# Patient Record
Sex: MALE | Race: WHITE | NOT HISPANIC OR LATINO | Employment: OTHER | URBAN - METROPOLITAN AREA
[De-identification: names, ages, dates, MRNs, and addresses within clinical notes are randomized per-mention and may not be internally consistent; named-entity substitution may affect disease eponyms.]

---

## 2020-08-31 ENCOUNTER — HISTORICAL (OUTPATIENT)
Dept: RADIOLOGY | Facility: HOSPITAL | Age: 55
End: 2020-08-31

## 2023-02-05 ENCOUNTER — HOSPITAL ENCOUNTER (EMERGENCY)
Facility: HOSPITAL | Age: 58
Discharge: ANOTHER HOSPITAL | End: 2023-02-05
Attending: EMERGENCY MEDICINE | Admitting: INTERNAL MEDICINE
Payer: COMMERCIAL

## 2023-02-05 VITALS
WEIGHT: 222.25 LBS | RESPIRATION RATE: 27 BRPM | SYSTOLIC BLOOD PRESSURE: 147 MMHG | DIASTOLIC BLOOD PRESSURE: 105 MMHG | HEART RATE: 104 BPM | OXYGEN SATURATION: 93 % | TEMPERATURE: 98 F

## 2023-02-05 DIAGNOSIS — I63.9 ACUTE ISCHEMIC STROKE: Primary | ICD-10-CM

## 2023-02-05 DIAGNOSIS — R29.898 LEFT ARM WEAKNESS: ICD-10-CM

## 2023-02-05 DIAGNOSIS — R29.810 FACIAL WEAKNESS: ICD-10-CM

## 2023-02-05 DIAGNOSIS — I63.9 STROKE: ICD-10-CM

## 2023-02-05 DIAGNOSIS — R29.898 LEFT LEG WEAKNESS: ICD-10-CM

## 2023-02-05 DIAGNOSIS — R53.1 GENERAL WEAKNESS: ICD-10-CM

## 2023-02-05 DIAGNOSIS — R53.1 UNILATERAL WEAKNESS: ICD-10-CM

## 2023-02-05 LAB
ALBUMIN SERPL-MCNC: 4 G/DL (ref 3.5–5)
ALBUMIN/GLOB SERPL: 1.3 RATIO (ref 1.1–2)
ALP SERPL-CCNC: 78 UNIT/L (ref 40–150)
ALT SERPL-CCNC: 32 UNIT/L (ref 0–55)
AMPHET UR QL SCN: NEGATIVE
APPEARANCE UR: CLEAR
AST SERPL-CCNC: 21 UNIT/L (ref 5–34)
BACTERIA #/AREA URNS AUTO: NORMAL /HPF
BARBITURATE SCN PRESENT UR: NEGATIVE
BASOPHILS # BLD AUTO: 0.03 X10(3)/MCL (ref 0–0.2)
BASOPHILS NFR BLD AUTO: 0.5 %
BENZODIAZ UR QL SCN: NEGATIVE
BILIRUB UR QL STRIP.AUTO: NEGATIVE MG/DL
BILIRUBIN DIRECT+TOT PNL SERPL-MCNC: 0.9 MG/DL
BUN SERPL-MCNC: 11.5 MG/DL (ref 8.4–25.7)
CALCIUM SERPL-MCNC: 9.5 MG/DL (ref 8.4–10.2)
CANNABINOIDS UR QL SCN: NEGATIVE
CHLORIDE SERPL-SCNC: 106 MMOL/L (ref 98–107)
CHOLEST SERPL-MCNC: 255 MG/DL
CHOLEST/HDLC SERPL: 6 {RATIO} (ref 0–5)
CO2 SERPL-SCNC: 24 MMOL/L (ref 22–29)
COCAINE UR QL SCN: NEGATIVE
COLOR UR AUTO: YELLOW
CREAT SERPL-MCNC: 0.75 MG/DL (ref 0.73–1.18)
CRP SERPL-MCNC: 2.4 MG/L
EOSINOPHIL # BLD AUTO: 0.01 X10(3)/MCL (ref 0–0.9)
EOSINOPHIL NFR BLD AUTO: 0.2 %
ERYTHROCYTE [DISTWIDTH] IN BLOOD BY AUTOMATED COUNT: 14.5 % (ref 11.5–17)
ERYTHROCYTE [SEDIMENTATION RATE] IN BLOOD: 18 MM/HR (ref 0–15)
EST. AVERAGE GLUCOSE BLD GHB EST-MCNC: 134.1 MG/DL
FENTANYL UR QL SCN: NEGATIVE
GFR SERPLBLD CREATININE-BSD FMLA CKD-EPI: >60 MLS/MIN/1.73/M2
GLOBULIN SER-MCNC: 3 GM/DL (ref 2.4–3.5)
GLUCOSE SERPL-MCNC: 113 MG/DL (ref 74–100)
GLUCOSE UR QL STRIP.AUTO: NEGATIVE MG/DL
HBA1C MFR BLD: 6.3 %
HCT VFR BLD AUTO: 46.8 % (ref 42–52)
HDLC SERPL-MCNC: 46 MG/DL (ref 35–60)
HGB BLD-MCNC: 15.5 GM/DL (ref 14–18)
IMM GRANULOCYTES # BLD AUTO: 0.01 X10(3)/MCL (ref 0–0.04)
IMM GRANULOCYTES NFR BLD AUTO: 0.2 %
INR BLD: 0.89 (ref 0–1.3)
KETONES UR QL STRIP.AUTO: NEGATIVE MG/DL
LDLC SERPL CALC-MCNC: 175 MG/DL (ref 50–140)
LEUKOCYTE ESTERASE UR QL STRIP.AUTO: NEGATIVE UNIT/L
LYMPHOCYTES # BLD AUTO: 2.02 X10(3)/MCL (ref 0.6–4.6)
LYMPHOCYTES NFR BLD AUTO: 33.5 %
MCH RBC QN AUTO: 29.5 PG
MCHC RBC AUTO-ENTMCNC: 33.1 MG/DL (ref 33–36)
MCV RBC AUTO: 89.1 FL (ref 80–94)
MDMA UR QL SCN: NEGATIVE
MONOCYTES # BLD AUTO: 0.55 X10(3)/MCL (ref 0.1–1.3)
MONOCYTES NFR BLD AUTO: 9.1 %
NEUTROPHILS # BLD AUTO: 3.41 X10(3)/MCL (ref 2.1–9.2)
NEUTROPHILS NFR BLD AUTO: 56.5 %
NITRITE UR QL STRIP.AUTO: NEGATIVE
NRBC BLD AUTO-RTO: 0 %
OPIATES UR QL SCN: POSITIVE
PCP UR QL: NEGATIVE
PH UR STRIP.AUTO: 7 [PH]
PH UR: 6 [PH] (ref 3–11)
PLATELET # BLD AUTO: 249 X10(3)/MCL (ref 130–400)
PMV BLD AUTO: 10.7 FL (ref 7.4–10.4)
POC PTINR: 1 (ref 0.9–1.2)
POC PTWBT: 12 SEC (ref 9.7–14.3)
POCT GLUCOSE: 102 MG/DL (ref 70–110)
POTASSIUM SERPL-SCNC: 4.8 MMOL/L (ref 3.5–5.1)
PROT SERPL-MCNC: 7 GM/DL (ref 6.4–8.3)
PROT UR QL STRIP.AUTO: NEGATIVE MG/DL
PROTHROMBIN TIME: 12 SECONDS (ref 12.5–14.5)
RBC # BLD AUTO: 5.25 X10(6)/MCL (ref 4.7–6.1)
RBC #/AREA URNS AUTO: <5 /HPF
RBC UR QL AUTO: NEGATIVE UNIT/L
SAMPLE: NORMAL
SODIUM SERPL-SCNC: 141 MMOL/L (ref 136–145)
SP GR UR STRIP.AUTO: >=1.04 (ref 1–1.03)
SPECIFIC GRAVITY, URINE AUTO (.000) (OHS): 1.04 (ref 1–1.03)
SQUAMOUS #/AREA URNS AUTO: <5 /HPF
TRIGL SERPL-MCNC: 170 MG/DL (ref 34–140)
TROPONIN I SERPL-MCNC: <0.01 NG/ML (ref 0–0.04)
TSH SERPL-ACNC: 0.77 UIU/ML (ref 0.35–4.94)
UROBILINOGEN UR STRIP-ACNC: 0.2 MG/DL
VLDLC SERPL CALC-MCNC: 34 MG/DL
WBC # SPEC AUTO: 6 X10(3)/MCL (ref 4.5–11.5)
WBC #/AREA URNS AUTO: <5 /HPF

## 2023-02-05 PROCEDURE — 99291 CRITICAL CARE FIRST HOUR: CPT | Mod: 25

## 2023-02-05 PROCEDURE — 85610 PROTHROMBIN TIME: CPT | Performed by: NURSE PRACTITIONER

## 2023-02-05 PROCEDURE — 80307 DRUG TEST PRSMV CHEM ANLYZR: CPT | Performed by: INTERNAL MEDICINE

## 2023-02-05 PROCEDURE — 93010 ELECTROCARDIOGRAM REPORT: CPT | Mod: ,,, | Performed by: INTERNAL MEDICINE

## 2023-02-05 PROCEDURE — 11000001 HC ACUTE MED/SURG PRIVATE ROOM

## 2023-02-05 PROCEDURE — 84484 ASSAY OF TROPONIN QUANT: CPT | Performed by: NURSE PRACTITIONER

## 2023-02-05 PROCEDURE — 85025 COMPLETE CBC W/AUTO DIFF WBC: CPT | Performed by: NURSE PRACTITIONER

## 2023-02-05 PROCEDURE — 80061 LIPID PANEL: CPT | Performed by: PHYSICIAN ASSISTANT

## 2023-02-05 PROCEDURE — 84443 ASSAY THYROID STIM HORMONE: CPT | Performed by: PHYSICIAN ASSISTANT

## 2023-02-05 PROCEDURE — 81001 URINALYSIS AUTO W/SCOPE: CPT | Performed by: NURSE PRACTITIONER

## 2023-02-05 PROCEDURE — 25500020 PHARM REV CODE 255: Performed by: EMERGENCY MEDICINE

## 2023-02-05 PROCEDURE — 93005 ELECTROCARDIOGRAM TRACING: CPT

## 2023-02-05 PROCEDURE — 80053 COMPREHEN METABOLIC PANEL: CPT | Performed by: NURSE PRACTITIONER

## 2023-02-05 PROCEDURE — 83036 HEMOGLOBIN GLYCOSYLATED A1C: CPT | Performed by: PHYSICIAN ASSISTANT

## 2023-02-05 PROCEDURE — 25000003 PHARM REV CODE 250: Performed by: EMERGENCY MEDICINE

## 2023-02-05 PROCEDURE — 93010 EKG 12-LEAD: ICD-10-PCS | Mod: ,,, | Performed by: INTERNAL MEDICINE

## 2023-02-05 PROCEDURE — 86140 C-REACTIVE PROTEIN: CPT | Performed by: PHYSICIAN ASSISTANT

## 2023-02-05 PROCEDURE — 85651 RBC SED RATE NONAUTOMATED: CPT | Performed by: PHYSICIAN ASSISTANT

## 2023-02-05 RX ORDER — NAPROXEN SODIUM 220 MG/1
324 TABLET, FILM COATED ORAL
Status: COMPLETED | OUTPATIENT
Start: 2023-02-05 | End: 2023-02-05

## 2023-02-05 RX ORDER — LABETALOL HYDROCHLORIDE 5 MG/ML
10 INJECTION, SOLUTION INTRAVENOUS
Status: DISCONTINUED | OUTPATIENT
Start: 2023-02-05 | End: 2023-02-06 | Stop reason: HOSPADM

## 2023-02-05 RX ORDER — SODIUM CHLORIDE 0.9 % (FLUSH) 0.9 %
10 SYRINGE (ML) INJECTION EVERY 8 HOURS
Status: DISCONTINUED | OUTPATIENT
Start: 2023-02-05 | End: 2023-02-06 | Stop reason: HOSPADM

## 2023-02-05 RX ADMIN — IOPAMIDOL 100 ML: 755 INJECTION, SOLUTION INTRAVENOUS at 02:02

## 2023-02-05 RX ADMIN — Medication 324 MG: at 03:02

## 2023-02-05 NOTE — FIRST PROVIDER EVALUATION
Medical screening examination initiated.  I have conducted a focused provider triage encounter, findings are as follows:    Brief history of present illness:  58y/o M presents the ED with dizziness with left side weakness. Onset 10:30 AM . States all symptoms resolved after one hour.    There were no vitals filed for this visit.    Pertinent physical exam:  AAA x 3    Brief workup plan:  Labs/Imaging     Preliminary workup initiated; this workup will be continued and followed by the physician or advanced practice provider that is assigned to the patient when roomed.

## 2023-02-05 NOTE — Clinical Note
Diagnosis: Unilateral weakness [295553]   Admitting Provider:: INOCENCIA BARRAGAN [[id number]]   Future Attending Provider: INOCENCIA BARRAGAN [[id number]]   Reason for IP Medical Treatment  (Clinical interventions that can only be accomplished in the IP setting? ) :: see diagnosis   Estimated Length of Stay:: 2 midnights   I certify that Inpatient services for greater than or equal to 2 midnights are medically necessary:: Yes   Plans for Post-Acute care--if anticipated (pick the single best option):: A. No post acute care anticipated at this time

## 2023-02-05 NOTE — ED PROVIDER NOTES
Encounter Date: 2/5/2023    SCRIBE #1 NOTE: I, Radha Mckenzie, am scribing for, and in the presence of,  Elton Mitchell MD. I have scribed the following portions of the note - Other sections scribed: HPI, ROS, PE, MDM.     History     Chief Complaint   Patient presents with    Weakness     Pt to ER via POV for weakness.  Started at 1030 am.  Resolved now.  Pt states left side droop and weakness, took approx 30 minutes to resolve.  No symptoms at this time.       Patient is a 57 year old male with a PMH of a stroke and a hx of HTN that presents to the ED for worsening weakness today. Pt reports for about 6 months he has sudden 1-2 minute episodes of weakness in his bilateral legs and arms with positional changes (getting up). His legs shake and he can not control them. Today, he had an episode that affected his entire L-side lasting approximately 30 minutes. Wife reports the left side of his mouth was drooping, his tongue would favor the left side of his mouth, and abnormal speech. He was unable to move left arm or left leg at all today which was new and different.  He reports he feels back to normal at this time. He is prescribed 81 mg of ASA no other anticoagulation.  Wife reports the patient is back to normal currently    The history is provided by the patient and the spouse. No  was used.   Review of patient's allergies indicates:  No Known Allergies  No past medical history on file.  No past surgical history on file.  No family history on file.     Review of Systems   Constitutional:  Negative for chills and fever.   Respiratory:  Negative for cough, chest tightness and shortness of breath.    Cardiovascular:  Negative for chest pain.   Gastrointestinal:  Negative for abdominal pain, nausea and vomiting.   Musculoskeletal:  Negative for myalgias and neck pain.   Neurological:  Positive for weakness. Negative for syncope.        Change in speech during episodes. Shaking.     All other systems  reviewed and are negative.    Physical Exam     Initial Vitals [02/05/23 1324]   BP Pulse Resp Temp SpO2   (!) 141/93 96 18 98.4 °F (36.9 °C) 97 %      MAP       --         Physical Exam    Nursing note and vitals reviewed.  Constitutional: He appears well-developed and well-nourished. No distress.   HENT:   Head: Normocephalic and atraumatic.   Eyes: Conjunctivae and EOM are normal. Pupils are equal, round, and reactive to light.   Cardiovascular:  Normal rate.           Pulmonary/Chest: No respiratory distress. He has no wheezes. He has no rhonchi.   Abdominal: Abdomen is soft. There is no abdominal tenderness. There is no rebound and no guarding.   Musculoskeletal:         General: Normal range of motion.     Neurological: He is alert and oriented to person, place, and time. He has normal strength. No cranial nerve deficit or sensory deficit. GCS score is 15. GCS eye subscore is 4. GCS verbal subscore is 5. GCS motor subscore is 6.   Finger to nose ataxia, more of a tremor, bilaterally. Cranial nerves intact. No pronator drift. No leg drift.  Normal sensation in the upper and lower extremities.  Able to identify objects.  Speech is clear.   Skin: Skin is warm and dry.   Psychiatric: He has a normal mood and affect.       ED Course   Critical Care    Date/Time: 2/5/2023 3:44 PM  Performed by: Elton Mitchell MD  Authorized by: Elton Mitchell MD   Direct patient critical care time: 25 minutes  Additional history critical care time: 5 minutes  Ordering / reviewing critical care time: 10 minutes  Documentation critical care time: 12 minutes  Consulting other physicians critical care time: 8 minutes  Total critical care time (exclusive of procedural time) : 60 minutes      Labs Reviewed   COMPREHENSIVE METABOLIC PANEL - Abnormal; Notable for the following components:       Result Value    Glucose Level 113 (*)     All other components within normal limits   URINALYSIS - Abnormal; Notable for the following  components:    Specific Gravity, UA >=1.040 (*)     All other components within normal limits   CBC WITH DIFFERENTIAL - Abnormal; Notable for the following components:    MPV 10.7 (*)     All other components within normal limits   PROTIME-INR - Abnormal; Notable for the following components:    PT 12.0 (*)     All other components within normal limits   LIPID PANEL - Abnormal; Notable for the following components:    Cholesterol Total 255 (*)     Triglyceride 170 (*)     Cholesterol/HDL Ratio 6 (*)     LDL Cholesterol 175.00 (*)     All other components within normal limits   TROPONIN I - Normal   C-REACTIVE PROTEIN - Normal   URINALYSIS, MICROSCOPIC - Normal   CBC W/ AUTO DIFFERENTIAL    Narrative:     The following orders were created for panel order CBC Auto Differential.  Procedure                               Abnormality         Status                     ---------                               -----------         ------                     CBC with Differential[[id number]]        Abnormal            Final result                 Please view results for these tests on the individual orders.   HEMOGLOBIN A1C   TSH   SEDIMENTATION RATE   DRUG SCREEN, URINE (BEAKER)   URINALYSIS, REFLEX TO URINE CULTURE   POCT GLUCOSE   ISTAT PROCEDURE        ECG Results              EKG 12-lead (Preliminary result)  Result time 02/05/23 13:56:48      ED Interpretation by Elton Mitchell MD (02/05/23 13:56:48, [facility] - Emergency Dept, Emergency Medicine)    EKG at 1:54 p.m..  94 minute.  Normal sinus rhythm no ST elevation or depression.                                  Imaging Results              CT HEAD FOR STROKE (Preliminary result)  Result time 02/05/23 18:04:40      Preliminary result by Pantera Verduzco MD (02/05/23 18:04:40)                   Narrative:    START OF REPORT:  Technique: CT of the head was performed without intravenous contrast with axial as well as coronal and sagittal  images.    Comparison: Correlation is with CTA study dated â2023-02-05 14:26:27â.    Dosage Information: Automated Exposure Control was utilized 998.58 mGy.cm.    Clinical history: New onset left side weakness, slurred speech.    Findings:  Hemorrhage: No acute intracranial hemorrhage is seen.  CSF spaces: The ventricles, sulci and basal cisterns all appear mildly prominent consistent with global cerebral atrophy.  Brain parenchyma: There is preservation of the grey white junction throughout. No acute major vascular territory infarct is identified.  Cerebellum: Unremarkable.  Vascular: There is a dense MCA sign on the right.  Sella and skull base: The sella appears to be within normal limits for age.  Cerebellopontine angles: Within normal limits.  Intracranial calcifications: Incidental note is made of bilateral choroid plexus calcification. Incidental note is made of some pineal region calcification. Incidental note is made of some calcification of the falx.  Calvarium: No acute linear or depressed skull fracture is seen.    Maxillofacial Structures:  Paranasal sinuses: The visualized paranasal sinuses appear clear with no definitive mucoperiosteal thickening or air fluid levels identified.  Orbits: The orbits appear unremarkable.  Zygomatic arches: The zygomatic arches are intact and unremarkable.  Temporal bones and mastoids: The temporal bones and mastoids appear unremarkable.  TMJ: The mandibular condyles appear normally placed with respect to the mandibular fossa.    Visualized upper cervical spine: The visualized cervical spine appears unremarkable.    Notifications: The results were discussed with the emergency room physician (Dr Mitchell) prior to dictation at 2023-02-05 18:11:56 CST.      Impression:  1. There is a dense MCA sign on the right. Correlate with clinical findings as regards further evaluation and follow up.  2. No acute major vascular territory infarct is identified.  3. No acute  intracranial hemorrhage is seen.  4. Details and findings as noted above.                          Preliminary result by Interface, Rad Results In (02/05/23 18:04:40)                   Narrative:    START OF REPORT:  Technique: CT of the head was performed without intravenous contrast with axial as well as coronal and sagittal images.    Comparison: Correlation is with CTA study dated â2023-02-05 14:26:27â.    Dosage Information: Automated Exposure Control was utilized 998.58 mGy.cm.    Clinical history: New onset left side weakness, slurred speech.    Findings:  Hemorrhage: No acute intracranial hemorrhage is seen.  CSF spaces: The ventricles, sulci and basal cisterns all appear mildly prominent consistent with global cerebral atrophy.  Brain parenchyma: There is preservation of the grey white junction throughout. No acute major vascular territory infarct is identified.  Cerebellum: Unremarkable.  Vascular: There is a dense MCA sign on the right.  Sella and skull base: The sella appears to be within normal limits for age.  Cerebellopontine angles: Within normal limits.  Intracranial calcifications: Incidental note is made of bilateral choroid plexus calcification. Incidental note is made of some pineal region calcification. Incidental note is made of some calcification of the falx.  Calvarium: No acute linear or depressed skull fracture is seen.    Maxillofacial Structures:  Paranasal sinuses: The visualized paranasal sinuses appear clear with no definitive mucoperiosteal thickening or air fluid levels identified.  Orbits: The orbits appear unremarkable.  Zygomatic arches: The zygomatic arches are intact and unremarkable.  Temporal bones and mastoids: The temporal bones and mastoids appear unremarkable.  TMJ: The mandibular condyles appear normally placed with respect to the mandibular fossa.    Visualized upper cervical spine: The visualized cervical spine appears unremarkable.    Notifications: The results  were discussed with the emergency room physician (Dr Mitchell) prior to dictation at 2023-02-05 18:11:56 CST.      Impression:  1. There is a dense MCA sign on the right. Correlate with clinical findings as regards further evaluation and follow up.  2. No acute major vascular territory infarct is identified.  3. No acute intracranial hemorrhage is seen.  4. Details and findings as noted above.                                         MRI BRAIN WITHOUT CONTRAST (Final result)  Result time 02/05/23 16:53:44      Final result by Jose Nassar MD (02/05/23 16:53:44)                   Impression:      1.  Right middle cerebral artery territory small acute nonhemorrhagic infarct.    2.  Minimal chronic microangiopathic ischemia.    .      Electronically signed by: Jose Nassar  Date:    02/05/2023  Time:    16:53               Narrative:    EXAMINATION:  MRI BRAIN WITHOUT CONTRAST    CLINICAL HISTORY:  Stroke, follow up;    TECHNIQUE:  Multiplanar MRI sequences were performed of the brain without contrast.    COMPARISON:  CT angiogram February 5, 2023.    FINDINGS:  There is right middle cerebral artery territory acute infarct with diffusion restriction seen on images 14 to 17 series 4 with corresponding low signal intensity on ADC map and T2 FLAIR hyperintensity.  Chronic microvascular ischemic changes are minimal with periventricular and deep white matter few punctate T2 FLAIR hyperintense signals.  Gradient echo sequences demonstrate no evidence of de phasing artifact to suggest hemorrhagic byproducts. The sella and suprasellar areas are unremarkable.    The cerebellar tonsils are normally positioned. There is no acute intracranial hemorrhage, hydrocephalus, midline shift or mass effect. No acute extra axial fluid collections identified. The mastoid air cells are clear.                                       CTA Head and Neck (xpd) (Final result)  Result time 02/05/23 15:22:06      Final result by Jose Nassar MD  (02/05/23 15:22:06)                   Impression:      1.  Severe hemodynamically significant stenosis of the proximal right internal carotid artery by calcified plaques and suggested intraluminal thrombus.    2.  Diminished flow within the M1 segment right middle cerebral artery which tapers distally with no flow within inferior division and diminished flow within superior division of the right middle cerebral artery.    3.  Suspected early infarct in the territory of the right middle cerebral artery.    Findings were notified to Dr. Mitchell February 5, 2023 at 15:21 hours      Electronically signed by: Jose Nassar  Date:    02/05/2023  Time:    15:22               Narrative:    EXAMINATION:  CTA HEAD AND NECK (XPD)    TECHNIQUE:  Brain and neck imaging was performed from skull base to vertex prior to intravenous contrast. CT Angiogram of head and neck was subsequently performed following intravenous contrast administration.  Coronal and sagittal MPR reconstructions were performed in addition to coronal and sagittal MIP reconstructions.    Dose length product was 3529 mGycm. Automated exposure control was utilized to minimize radiation dose.    COMPARISON:  None available.    FINDINGS:  Carotid arteries are assessed in accordance with the NASCET criteria.    The unenhanced portion of the study shows mild chronic microvascular ischemia and atrophy.  There is subtle hypodensity involving the right middle cerebral artery territory suspected for early nonhemorrhagic infarct.  The M1 segment right middle cerebral artery is relatively hyperdense.  There is no intracranial hemorrhage, mass effect or midline shift.  No hydrocephalus    Aortic arch is remarkable for mild calcified plaques without aneurysmal dilatation or dissection.  There is no flow-limiting stenosis involving the brachiocephalic trunk and the subclavian arteries.    There is mild stenosis at the origin of the right common carotid artery.  There is no  hemodynamically significant stenosis of the right common carotid artery and there is no intra luminal thrombus or dissection.  There are calcified plaques which involve the proximal portion of the right internal carotid artery.  There is also associated proximal right internal carotid artery intraluminal hypodensity suggestive for thrombus from image 311 to image 303 series 9.  Combination of these findings result in severe hemodynamically significant stenosis of the proximal right internal carotid artery.  Flow is present within the distal right small caliber internal carotid artery.  There are also calcified plaques which involve the cavernous portion of the right internal carotid artery resulting in 50% stenosis.  There is also narrowing of the proximal portion of the right external carotid artery.    There are calcified plaques which involve the distal common carotid artery prior to bifurcation and extent 2 involve the proximal left internal carotid artery resulting in up to 40% stenosis.  Mild calcified plaques involve the supraclinoid portion left internal carotid artery without significant stenosis.    There is diminished flow within the M1 segment of the right middle cerebral artery which tapers distally and there is no flow within the inferior division of the right middle cerebral artery.  There is diminished flow within superior division of the right middle cerebral artery.  The left middle cerebral artery and bilateral anterior cerebral arteries and major branches are patent.    There is unremarkable flow within bilateral vertebral arteries.  The basilar artery is patent.  There is also flow bilaterally within the posterior cerebral arteries.                                       X-Ray Chest AP Portable (Final result)  Result time 02/05/23 14:30:04      Final result by Carlos Shore MD (02/05/23 14:30:04)                   Impression:      No acute findings.      Electronically signed by: Carlos  Silviano  Date:    02/05/2023  Time:    14:30               Narrative:    EXAMINATION:  XR CHEST AP PORTABLE    CLINICAL HISTORY:  Weakness    COMPARISON:  31 August 2020    FINDINGS:  Portable frontal view of the chest was obtained. The heart is not enlarged.  Lungs are clear.  There is no pneumothorax or significant effusion.                                       Medications   sodium chloride 0.9% flush 10 mL (has no administration in time range)   labetaloL injection 10 mg (has no administration in time range)   iopamidoL (ISOVUE-370) injection 100 mL (100 mLs Intravenous Given 2/5/23 1448)   aspirin chewable tablet 324 mg (324 mg Oral Given 2/5/23 1545)     Medical Decision Making:   History:   I obtained history from: someone other than patient.       <> Summary of History: Patient's wife corresponds with the patient's story.  Old Medical Records: I decided to obtain old medical records.  Independently Interpreted Test(s):   I have ordered and independently interpreted X-rays - see prior notes.  I have ordered and independently interpreted EKG Reading(s) - see prior notes  Clinical Tests:   Lab Tests: Reviewed and Ordered  The following lab test(s) were unremarkable: Troponin, PTT, PT, Urinalysis, CBC, CMP and UPT  Radiological Study: Ordered and Reviewed  Medical Tests: Ordered and Reviewed  Other:   I have discussed this case with another health care provider.   Medical Decision Making  CVA, TIA, medication side effect, seizure    Problems Addressed:  Unilateral weakness: acute illness or injury that poses a threat to life or bodily functions    Amount and/or Complexity of Data Reviewed  Independent Historian: spouse     Details: Patient was unable to move his left arm left leg his left face was drooping and is ruling symptoms lasted about 30 minutes and then resolved  External Data Reviewed: labs, radiology and ECG.  Labs: ordered. Decision-making details documented in ED Course.  Radiology: ordered and  independent interpretation performed. Decision-making details documented in ED Course.  ECG/medicine tests: ordered and independent interpretation performed. Decision-making details documented in ED Course.    Critical Care  Total time providing critical care: 30-74 minutes         Scribe Attestation:   Scribe #1: I performed the above scribed service and the documentation accurately describes the services I performed. I attest to the accuracy of the note.    Attending Attestation:           Physician Attestation for Scribe:  Physician Attestation Statement for Scribe #1: I, Elton Mitchell MD, reviewed documentation, as scribed by Radha Mckenzie in my presence, and it is both accurate and complete.           ED Course as of 02/05/23 1759   Sun Feb 05, 2023   1342 Patient has no neurologic deficits currently with the exception of a mild finger-to-nose ataxia that is bilateral it is more of extension tremor.  This is not a new finding for him.  Takes 81 daily baby aspirin.  Getting CT head if no acute bleed will give 324 of aspirin now.  Symptoms strongly suggestive of stroke which seems to have completely resolved at this time.  I discussed TIA with them.  He will need admission for further stroke workup [LF]   1452 I suspect patient TIA today.  Lab permissive hypertension [LF]   1523 Discussed CT angiogram findings with radiologist.  Patient's symptoms have currently resolved my NIH score is 0.  He is not a candidate for emergent neurointerventional procedure at this time.  Patient will be admitted for further stroke workup and treatment.  Paged Neurology to discuss case  [LF]   1525 Paged Neurology [ED]   1529 Repeat exam neuro exam is unchanged [LF]   1530 Spoke with Neurology [ED]   1531 Discussed case with Dr Mckee [LF]   1532 Paged Hospitalist [ED]   1537 Spoke with Hospitalist   [ED]   1538 Discussed with adm Lindait [LF]   1715 Discussed with Dr Mckee. Recs patient transferred to facility with  Neurointerventional based on new developments [LF]   1719 Paged transfer center.  [ED]   1719 Spoke with transfer center. [ED]   1719 Plan was admit the patient.  He was still boarding in the emergency department had not yet gone up to the floor.  When he finished his MRI he had recurrence of left facial droop left arm and left leg weakness.  Mild slurred speech, mild L gaze deficit. Normal visual fields. Normal sensation, no extinction.  Able to identify objects and people and answer questions appropriately.  NIH 10.  Admission canceled.  Discussed with neurology recommends patient be transferred for Interventional Radiology or neurointerventional we do not have this service today.  Discussed with the transfer center.  [facility] on stroke divert.  They will continue to try to find appropriate placement. Neuro recs NO tenecteplase [LF]   1745 Discussed with transfer center, Dr Galvez will accept ED to ED, their Neuro interventional specialist is aware [LF]      ED Course User Index  [ED] Radha Mckenzie  [LF] Elton Mitchell MD                 Clinical Impression:   Final diagnoses:  [R53.1] Unilateral weakness  [R53.1] General weakness  [I63.9] Acute ischemic stroke (Primary)  [R29.810] Facial weakness  [R29.898] Left arm weakness  [R29.898] Left leg weakness        ED Disposition Condition    Transfer to Another Facility Serious               Elton Mitchell MD  02/05/23 1544    Elton Mitchell MD  02/05/23 1754    Elton Mitchell MD  02/05/23 1756

## 2023-02-06 LAB
ANION GAP SERPL CALC-SCNC: 15 MMOL/L (ref 8–16)
BUN SERPL-MCNC: 11 MG/DL (ref 6–30)
CHLORIDE SERPL-SCNC: 103 MMOL/L (ref 95–110)
CREAT SERPL-MCNC: 0.7 MG/DL (ref 0.5–1.4)
GLUCOSE SERPL-MCNC: 109 MG/DL (ref 70–110)
HCT VFR BLD CALC: 47 %PCV (ref 36–54)
HGB BLD-MCNC: 16 G/DL
POC IONIZED CALCIUM: 1.18 MMOL/L (ref 1.06–1.42)
POC TCO2 (MEASURED): 27 MMOL/L (ref 23–29)
POTASSIUM BLD-SCNC: 4.5 MMOL/L (ref 3.5–5.1)
SAMPLE: NORMAL
SODIUM BLD-SCNC: 140 MMOL/L (ref 136–145)

## 2023-02-07 LAB
LEFT CCA DIST DIAS: 23 CM/S
LEFT CCA DIST SYS: 79 CM/S
LEFT CCA PROX DIAS: 20 CM/S
LEFT CCA PROX SYS: 74 CM/S
LEFT ECA DIAS: 28 CM/S
LEFT ECA SYS: 181 CM/S
LEFT ICA DIST DIAS: 24 CM/S
LEFT ICA DIST SYS: 79 CM/S
LEFT ICA MID DIAS: 21 CM/S
LEFT ICA MID SYS: 88 CM/S
LEFT ICA PROX DIAS: 30 CM/S
LEFT ICA PROX SYS: 123 CM/S
LEFT VERTEBRAL DIAS: 3 CM/S
LEFT VERTEBRAL SYS: 45 CM/S
OHS CV CAROTID RIGHT ICA EDV HIGHEST: 5
OHS CV CAROTID ULTRASOUND LEFT ICA/CCA RATIO: 1.56
OHS CV CAROTID ULTRASOUND RIGHT ICA/CCA RATIO: 1.25
OHS CV PV CAROTID LEFT HIGHEST CCA: 79
OHS CV PV CAROTID LEFT HIGHEST ICA: 123
OHS CV PV CAROTID RIGHT HIGHEST CCA: 32
OHS CV PV CAROTID RIGHT HIGHEST ICA: 20
OHS CV US CAROTID LEFT HIGHEST EDV: 30
RIGHT CCA DIST SYS: 16 CM/S
RIGHT CCA PROX SYS: 32 CM/S
RIGHT ECA SYS: 243 CM/S
RIGHT ICA DIST DIAS: 4 CM/S
RIGHT ICA DIST SYS: 9 CM/S
RIGHT ICA MID DIAS: 5 CM/S
RIGHT ICA MID SYS: 11 CM/S
RIGHT ICA PROX DIAS: 5 CM/S
RIGHT ICA PROX SYS: 20 CM/S
RIGHT VERTEBRAL SYS: 40 CM/S

## 2023-03-20 ENCOUNTER — TELEPHONE (OUTPATIENT)
Dept: FAMILY MEDICINE | Facility: CLINIC | Age: 58
End: 2023-03-20
Payer: COMMERCIAL

## 2023-03-20 NOTE — TELEPHONE ENCOUNTER
Nursing home call requesting refill on Norco after speaking with Dr Bailey he called the facility and spoke with Renetta. The patient has an active prescription for norco being filled at a local pharmacy and he will not be giving him any refills on Norco . He is willing to sign documentation to have his home medication be administered at the facility. This was all explained to the DON Renetta per Dr Bailey.

## 2023-04-04 DIAGNOSIS — I65.29 CAROTID ARTERY STENOSIS: Primary | ICD-10-CM

## 2023-04-19 ENCOUNTER — OFFICE VISIT (OUTPATIENT)
Dept: CARDIAC SURGERY | Facility: CLINIC | Age: 58
End: 2023-04-19
Payer: COMMERCIAL

## 2023-04-19 VITALS
HEART RATE: 79 BPM | HEIGHT: 70 IN | DIASTOLIC BLOOD PRESSURE: 79 MMHG | WEIGHT: 212 LBS | SYSTOLIC BLOOD PRESSURE: 129 MMHG | BODY MASS INDEX: 30.35 KG/M2

## 2023-04-19 DIAGNOSIS — I65.29 OCCLUSION AND STENOSIS OF UNSPECIFIED CAROTID ARTERY: Primary | ICD-10-CM

## 2023-04-19 DIAGNOSIS — I65.29 CAROTID ARTERY STENOSIS: ICD-10-CM

## 2023-04-19 PROCEDURE — 4010F ACE/ARB THERAPY RXD/TAKEN: CPT | Mod: CPTII,,, | Performed by: THORACIC SURGERY (CARDIOTHORACIC VASCULAR SURGERY)

## 2023-04-19 PROCEDURE — 3008F BODY MASS INDEX DOCD: CPT | Mod: CPTII,,, | Performed by: THORACIC SURGERY (CARDIOTHORACIC VASCULAR SURGERY)

## 2023-04-19 PROCEDURE — 1159F PR MEDICATION LIST DOCUMENTED IN MEDICAL RECORD: ICD-10-PCS | Mod: CPTII,,, | Performed by: THORACIC SURGERY (CARDIOTHORACIC VASCULAR SURGERY)

## 2023-04-19 PROCEDURE — 99204 OFFICE O/P NEW MOD 45 MIN: CPT | Mod: ,,, | Performed by: THORACIC SURGERY (CARDIOTHORACIC VASCULAR SURGERY)

## 2023-04-19 PROCEDURE — 3008F PR BODY MASS INDEX (BMI) DOCUMENTED: ICD-10-PCS | Mod: CPTII,,, | Performed by: THORACIC SURGERY (CARDIOTHORACIC VASCULAR SURGERY)

## 2023-04-19 PROCEDURE — 3078F PR MOST RECENT DIASTOLIC BLOOD PRESSURE < 80 MM HG: ICD-10-PCS | Mod: CPTII,,, | Performed by: THORACIC SURGERY (CARDIOTHORACIC VASCULAR SURGERY)

## 2023-04-19 PROCEDURE — 3074F SYST BP LT 130 MM HG: CPT | Mod: CPTII,,, | Performed by: THORACIC SURGERY (CARDIOTHORACIC VASCULAR SURGERY)

## 2023-04-19 PROCEDURE — 1159F MED LIST DOCD IN RCRD: CPT | Mod: CPTII,,, | Performed by: THORACIC SURGERY (CARDIOTHORACIC VASCULAR SURGERY)

## 2023-04-19 PROCEDURE — 3078F DIAST BP <80 MM HG: CPT | Mod: CPTII,,, | Performed by: THORACIC SURGERY (CARDIOTHORACIC VASCULAR SURGERY)

## 2023-04-19 PROCEDURE — 4010F PR ACE/ARB THEARPY RXD/TAKEN: ICD-10-PCS | Mod: CPTII,,, | Performed by: THORACIC SURGERY (CARDIOTHORACIC VASCULAR SURGERY)

## 2023-04-19 PROCEDURE — 3074F PR MOST RECENT SYSTOLIC BLOOD PRESSURE < 130 MM HG: ICD-10-PCS | Mod: CPTII,,, | Performed by: THORACIC SURGERY (CARDIOTHORACIC VASCULAR SURGERY)

## 2023-04-19 PROCEDURE — 99204 PR OFFICE/OUTPT VISIT, NEW, LEVL IV, 45-59 MIN: ICD-10-PCS | Mod: ,,, | Performed by: THORACIC SURGERY (CARDIOTHORACIC VASCULAR SURGERY)

## 2023-04-19 RX ORDER — PANTOPRAZOLE SODIUM 40 MG/1
1 TABLET, DELAYED RELEASE ORAL DAILY
COMMUNITY
Start: 2023-03-04

## 2023-04-19 RX ORDER — EZETIMIBE 10 MG/1
10 TABLET ORAL DAILY
COMMUNITY
Start: 2023-03-25

## 2023-04-19 RX ORDER — FOLIC ACID 1 MG/1
1 TABLET ORAL DAILY
COMMUNITY
Start: 2023-03-04

## 2023-04-19 RX ORDER — LISINOPRIL 10 MG/1
10 TABLET ORAL DAILY
COMMUNITY
Start: 2023-03-25

## 2023-04-19 RX ORDER — OMEPRAZOLE 40 MG/1
40 CAPSULE, DELAYED RELEASE ORAL EVERY MORNING
COMMUNITY
Start: 2023-02-04 | End: 2023-05-01

## 2023-04-19 RX ORDER — AMLODIPINE BESYLATE 5 MG/1
5 TABLET ORAL EVERY MORNING
COMMUNITY
Start: 2023-03-25

## 2023-04-19 RX ORDER — ACETAMINOPHEN, DEXTROMETHORPHAN HBR, DOXYLAMINE SUCCINATE, PHENYLEPHRINE HCL 650; 20; 12.5; 1 MG/30ML; MG/30ML; MG/30ML; MG/30ML
5 SOLUTION ORAL
COMMUNITY
Start: 2023-03-04 | End: 2023-05-04 | Stop reason: CLARIF

## 2023-04-19 RX ORDER — METOPROLOL SUCCINATE 25 MG/1
25 TABLET, EXTENDED RELEASE ORAL DAILY
COMMUNITY
Start: 2023-03-04

## 2023-04-19 RX ORDER — PRAVASTATIN SODIUM 40 MG/1
1 TABLET ORAL NIGHTLY
COMMUNITY
End: 2023-05-01

## 2023-04-19 RX ORDER — MELOXICAM 15 MG/1
15 TABLET ORAL NIGHTLY
COMMUNITY
Start: 2023-03-03

## 2023-04-19 RX ORDER — TALC
2 POWDER (GRAM) TOPICAL
COMMUNITY
Start: 2023-03-03 | End: 2023-05-04 | Stop reason: CLARIF

## 2023-04-19 RX ORDER — ASPIRIN 81 MG/1
81 TABLET ORAL NIGHTLY
COMMUNITY
Start: 2023-03-04

## 2023-04-19 RX ORDER — AMLODIPINE AND BENAZEPRIL HYDROCHLORIDE 10; 20 MG/1; MG/1
1 CAPSULE ORAL
COMMUNITY
Start: 2023-02-04 | End: 2023-05-01

## 2023-04-19 RX ORDER — HYDROCODONE BITARTRATE AND ACETAMINOPHEN 10; 325 MG/1; MG/1
1 TABLET ORAL 2 TIMES DAILY
COMMUNITY
Start: 2023-03-18

## 2023-04-19 RX ORDER — ASCORBIC ACID 500 MG
2 TABLET ORAL DAILY
COMMUNITY
Start: 2023-03-04

## 2023-04-19 RX ORDER — CLOPIDOGREL BISULFATE 75 MG/1
1 TABLET ORAL DAILY
COMMUNITY
Start: 2023-02-11

## 2023-04-19 RX ORDER — AMITRIPTYLINE HYDROCHLORIDE 25 MG/1
25 TABLET, FILM COATED ORAL NIGHTLY
COMMUNITY
Start: 2023-03-20

## 2023-04-19 RX ORDER — CYCLOBENZAPRINE HCL 10 MG
10 TABLET ORAL 3 TIMES DAILY
COMMUNITY
Start: 2023-02-14

## 2023-04-19 RX ORDER — ACETAMINOPHEN 325 MG/1
2 TABLET ORAL
COMMUNITY
Start: 2023-03-03 | End: 2023-05-04 | Stop reason: CLARIF

## 2023-04-19 RX ORDER — OMEGA-3 FATTY ACIDS/FISH OIL 340-1000MG
1 CAPSULE ORAL EVERY MORNING
COMMUNITY

## 2023-04-19 RX ORDER — CHOLECALCIFEROL (VITAMIN D3) 25 MCG
2 TABLET ORAL DAILY
COMMUNITY
Start: 2023-03-04

## 2023-04-19 RX ORDER — LANOLIN ALCOHOL/MO/W.PET/CERES
1 CREAM (GRAM) TOPICAL
COMMUNITY
Start: 2023-02-11 | End: 2023-05-04

## 2023-04-19 RX ORDER — ATORVASTATIN CALCIUM 40 MG/1
80 TABLET, FILM COATED ORAL NIGHTLY
COMMUNITY
Start: 2023-04-18 | End: 2023-11-15

## 2023-04-19 RX ORDER — DULOXETIN HYDROCHLORIDE 60 MG/1
60 CAPSULE, DELAYED RELEASE ORAL NIGHTLY
COMMUNITY
Start: 2023-03-03

## 2023-04-19 NOTE — PROGRESS NOTES
History & Physical    SUBJECTIVE:     History of Present Illness:  The patient is presenting for evaluation of severe right carotid stenosis and moderate left carotid stenosis.  He had a stroke in February at which time a CTA of the neck revealed evidence of severe right carotid stenosis subtotal with possible clot.  They attempted intervention but backed up in [place].  He is here for possible intervention      Chief Complaint   Patient presents with    Pre-op Exam      RE: [facility]       Review of patient's allergies indicates:  No Known Allergies    Current Outpatient Medications   Medication Sig Dispense Refill    acetaminophen (TYLENOL) 325 MG tablet Take 2 tablets by mouth.      ascorbic acid, vitamin C, (VITAMIN C) 500 MG tablet Take 2 tablets by mouth.      aspirin (ECOTRIN) 81 MG EC tablet Take 1 tablet by mouth.      clopidogreL (PLAVIX) 75 mg tablet Take 1 tablet by mouth.      cyanocobalamin, vitamin B-12, 1,000 mcg TbSR Take 5 tablets by mouth.      DULoxetine (CYMBALTA) 60 MG capsule Take by mouth.      folic acid (FOLVITE) 1 MG tablet Take 1 tablet by mouth.      melatonin (MELATIN) 3 mg tablet Take 2 tablets by mouth.      metoprolol succinate (TOPROL XL) 25 MG 24 hr tablet Take 1 tablet by mouth.      pantoprazole (PROTONIX) 40 MG tablet Take 1 tablet by mouth.      thiamine 100 MG tablet Take 1 tablet by mouth.      vitamin D (VITAMIN D3) 1000 units Tab Take 2 tablets by mouth.      amitriptyline (ELAVIL) 25 MG tablet       amLODIPine (NORVASC) 5 MG tablet Take 5 mg by mouth.      amlodipine-benazepril 10-20mg (LOTREL) 10-20 mg per capsule Take 1 capsule by mouth.      atorvastatin (LIPITOR) 40 MG tablet Take 40 mg by mouth.      cyclobenzaprine (FLEXERIL) 10 MG tablet Take 10 mg by mouth 3 (three) times daily.      ezetimibe (ZETIA) 10 mg tablet Take 10 mg by mouth.      HYDROcodone-acetaminophen (NORCO)  mg per tablet Take 1 tablet by mouth 2 (two) times daily.      lisinopriL 10  MG tablet Take 10 mg by mouth.      meloxicam (MOBIC) 15 MG tablet Take 15 mg by mouth.      omega-3 fatty acids-fish oil (FISH OIL) 340-1,000 mg Cap Take 1 capsule by mouth every morning.      omeprazole (PRILOSEC) 40 MG capsule Take 40 mg by mouth every morning.      pravastatin (PRAVACHOL) 40 MG tablet Take 1 tablet by mouth every evening.       No current facility-administered medications for this visit.       History reviewed. No pertinent past medical history.  Past Surgical History:   Procedure Laterality Date    HERNIA REPAIR  18 years    TONSILLECTOMY  55 years    VASECTOMY  23 years     Family History   Problem Relation Age of Onset    Arthritis Mother     Hearing loss Mother         Wears hearing aids    Heart disease Mother         Blockage    Stroke Mother     Arthritis Father     Diabetes Father     Hearing loss Father         Wears hearing aids    Heart disease Father         Heart attacks, blockages    Kidney disease Father     Vision loss Father         Diabetic    Heart disease Maternal Grandfather         Valve replacement, blockage    Early death Maternal Grandmother     Cancer Paternal Grandfather     Stroke Paternal Grandmother     Alcohol abuse Brother     Arthritis Brother     Alcohol abuse Brother     Cancer Brother         Skin cancer    Arthritis Brother     Asthma Maternal Aunt      Social History     Tobacco Use    Smoking status: Never    Smokeless tobacco: Never   Substance Use Topics    Alcohol use: Yes     Alcohol/week: 90.0 standard drinks     Types: 90 Cans of beer per week    Drug use: Yes     Frequency: 14.0 times per week     Types: Hydrocodone     Comment: Pain management        Review of Systems:  Review of Systems   Constitutional: Negative.    HENT: Negative.     Eyes: Negative.    Respiratory: Negative.     Cardiovascular: Negative.    Gastrointestinal: Negative.    Endocrine: Negative.    Genitourinary: Negative.    Musculoskeletal: Negative.         Claudications   Skin:  "Negative.    Allergic/Immunologic: Negative.    Neurological: Negative.    Hematological: Negative.    Psychiatric/Behavioral: Negative.       OBJECTIVE:     Vital Signs (Most Recent)  Pulse: 79 (04/19/23 1134)  BP: 129/79 (04/19/23 1134)  5' 10" (1.778 m)  96.2 kg (212 lb)     Physical Exam:  Physical Exam  Vitals reviewed.   Constitutional:       Appearance: Normal appearance.   HENT:      Head: Normocephalic and atraumatic.      Nose: Nose normal.      Mouth/Throat:      Mouth: Mucous membranes are dry.      Pharynx: Oropharynx is clear.   Eyes:      Extraocular Movements: Extraocular movements intact.      Conjunctiva/sclera: Conjunctivae normal.      Pupils: Pupils are equal, round, and reactive to light.   Cardiovascular:      Rate and Rhythm: Normal rate and regular rhythm.      Pulses: Normal pulses.   Pulmonary:      Effort: Pulmonary effort is normal.      Breath sounds: Normal breath sounds.   Abdominal:      General: Abdomen is flat.      Palpations: Abdomen is soft.   Musculoskeletal:         General: Normal range of motion.      Cervical back: Neck supple.   Skin:     General: Skin is warm and dry.   Neurological:      General: No focal deficit present.      Sensory: Sensory deficit present.      Motor: Weakness present.   Psychiatric:         Mood and Affect: Mood normal.       Laboratory:  None      Diagnostic Results:  CTA from February reviewed.      ASSESSMENT/PLAN:     Severe right carotid stenosis with a moderate left carotid stenosis.  I believe at this point the CTA is indicated as the patient had a clot in the past there.  Plan to get a CTA of the neck and make a decision based on it.  If he indeed has a subtotal obstruction of the right carotid artery I believe right carotid endarterectomy is indicated                "

## 2023-04-25 DIAGNOSIS — I63.9 CEREBROVASCULAR ACCIDENT (CVA), UNSPECIFIED MECHANISM: Primary | ICD-10-CM

## 2023-05-01 ENCOUNTER — OFFICE VISIT (OUTPATIENT)
Dept: NEUROLOGY | Facility: CLINIC | Age: 58
End: 2023-05-01
Payer: COMMERCIAL

## 2023-05-01 VITALS
DIASTOLIC BLOOD PRESSURE: 88 MMHG | HEIGHT: 70 IN | BODY MASS INDEX: 30.35 KG/M2 | SYSTOLIC BLOOD PRESSURE: 126 MMHG | WEIGHT: 212 LBS

## 2023-05-01 DIAGNOSIS — I63.9 CEREBROVASCULAR ACCIDENT (CVA), UNSPECIFIED MECHANISM: ICD-10-CM

## 2023-05-01 DIAGNOSIS — Z01.818 PRE-OP TESTING: ICD-10-CM

## 2023-05-01 DIAGNOSIS — I63.89 OTHER CEREBRAL INFARCTION: ICD-10-CM

## 2023-05-01 DIAGNOSIS — I63.9 CEREBROVASCULAR ACCIDENT (CVA), UNSPECIFIED MECHANISM: Primary | ICD-10-CM

## 2023-05-01 PROCEDURE — 99205 PR OFFICE/OUTPT VISIT, NEW, LEVL V, 60-74 MIN: ICD-10-PCS | Mod: S$GLB,,, | Performed by: PSYCHIATRY & NEUROLOGY

## 2023-05-01 PROCEDURE — 99999 PR PBB SHADOW E&M-EST. PATIENT-LVL IV: CPT | Mod: PBBFAC,,, | Performed by: PSYCHIATRY & NEUROLOGY

## 2023-05-01 PROCEDURE — 1159F PR MEDICATION LIST DOCUMENTED IN MEDICAL RECORD: ICD-10-PCS | Mod: CPTII,S$GLB,, | Performed by: PSYCHIATRY & NEUROLOGY

## 2023-05-01 PROCEDURE — 4010F ACE/ARB THERAPY RXD/TAKEN: CPT | Mod: CPTII,S$GLB,, | Performed by: PSYCHIATRY & NEUROLOGY

## 2023-05-01 PROCEDURE — 1159F MED LIST DOCD IN RCRD: CPT | Mod: CPTII,S$GLB,, | Performed by: PSYCHIATRY & NEUROLOGY

## 2023-05-01 PROCEDURE — 3074F PR MOST RECENT SYSTOLIC BLOOD PRESSURE < 130 MM HG: ICD-10-PCS | Mod: CPTII,S$GLB,, | Performed by: PSYCHIATRY & NEUROLOGY

## 2023-05-01 PROCEDURE — 3079F PR MOST RECENT DIASTOLIC BLOOD PRESSURE 80-89 MM HG: ICD-10-PCS | Mod: CPTII,S$GLB,, | Performed by: PSYCHIATRY & NEUROLOGY

## 2023-05-01 PROCEDURE — 4010F PR ACE/ARB THEARPY RXD/TAKEN: ICD-10-PCS | Mod: CPTII,S$GLB,, | Performed by: PSYCHIATRY & NEUROLOGY

## 2023-05-01 PROCEDURE — 3008F BODY MASS INDEX DOCD: CPT | Mod: CPTII,S$GLB,, | Performed by: PSYCHIATRY & NEUROLOGY

## 2023-05-01 PROCEDURE — 99205 OFFICE O/P NEW HI 60 MIN: CPT | Mod: S$GLB,,, | Performed by: PSYCHIATRY & NEUROLOGY

## 2023-05-01 PROCEDURE — 3008F PR BODY MASS INDEX (BMI) DOCUMENTED: ICD-10-PCS | Mod: CPTII,S$GLB,, | Performed by: PSYCHIATRY & NEUROLOGY

## 2023-05-01 PROCEDURE — 3074F SYST BP LT 130 MM HG: CPT | Mod: CPTII,S$GLB,, | Performed by: PSYCHIATRY & NEUROLOGY

## 2023-05-01 PROCEDURE — 99999 PR PBB SHADOW E&M-EST. PATIENT-LVL IV: ICD-10-PCS | Mod: PBBFAC,,, | Performed by: PSYCHIATRY & NEUROLOGY

## 2023-05-01 PROCEDURE — 3079F DIAST BP 80-89 MM HG: CPT | Mod: CPTII,S$GLB,, | Performed by: PSYCHIATRY & NEUROLOGY

## 2023-05-01 RX ORDER — UBIDECARENONE 30 MG
100 CAPSULE ORAL 3 TIMES DAILY
COMMUNITY
End: 2023-05-04 | Stop reason: CLARIF

## 2023-05-01 NOTE — PROGRESS NOTES
Neurology Outpatient Clinic Visit  Neurology    SUBJECTIVE:   Chief Complaint: Establishment of care due to Hx of CVA's    HPI: Mr. Mcnair is a 57 yom with Hx of previous CVA, HTN, HLD who presents to neurology clinic today for establishment of care.  Patient has CVA on 2/5/23 at which time he presented to [facility] ED, he was then transferred to [facility] in  for neurointerventional services. Was discharged to rehab on 2/10/23, spent 3 weeks at rehab, 3 weeks at NH.  Now home with outpatient therapy.   Was recently evaluated by CV Surgery on 4/19/23 for severe right carotid artery stenosis; previous CTA showed severe stenosis w/ possible clot. Pending further workup to determine if CEA appropriate.     Significant Imaging   MRI Brain 2/5/23:  FINDINGS:  There is right middle cerebral artery territory acute infarct with diffusion restriction seen on images 14 to 17 series 4 with corresponding low signal intensity on ADC map and T2 FLAIR hyperintensity.  Chronic microvascular ischemic changes are minimal with periventricular and deep white matter few punctate T2 FLAIR hyperintense signals.  Gradient echo sequences demonstrate no evidence of de phasing artifact to suggest hemorrhagic byproducts. The sella and suprasellar areas are unremarkable.     The cerebellar tonsils are normally positioned. There is no acute intracranial hemorrhage, hydrocephalus, midline shift or mass effect. No acute extra axial fluid collections identified. The mastoid air cells are clear.     Impression:     1.  Right middle cerebral artery territory small acute nonhemorrhagic infarct.     2.  Minimal chronic microangiopathic ischemia.    CTA Head and Neck 2/5/23:  Impression:     1.  Severe hemodynamically significant stenosis of the proximal right internal carotid artery by calcified plaques and suggested intraluminal thrombus.     2.  Diminished flow within the M1 segment right middle cerebral artery which tapers distally with  "no flow within inferior division and diminished flow within superior division of the right middle cerebral artery.     3.  Suspected early infarct in the territory of the right middle cerebral artery.    Past Medical History:   Diagnosis Date    CVA (cerebral vascular accident)     Depression     Hypertension     Mixed hyperlipidemia        Past Surgical History:   Procedure Laterality Date    HERNIA REPAIR  18 years    TONSILLECTOMY  55 years    VASECTOMY  23 years       Social History     Socioeconomic History    Marital status:    Tobacco Use    Smoking status: Never    Smokeless tobacco: Never   Substance and Sexual Activity    Alcohol use: Not Currently    Drug use: Yes     Frequency: 14.0 times per week     Types: Hydrocodone     Comment: Pain management    Sexual activity: Yes     Partners: Female     Birth control/protection: None     Comment: Hysterectomy           Review of Systems   Constitutional:  Negative for chills and fever.   Respiratory:  Negative for cough and shortness of breath.    Cardiovascular:  Negative for chest pain, palpitations and leg swelling.   Gastrointestinal:  Negative for abdominal pain, diarrhea, heartburn, nausea and vomiting.   Neurological:  Positive for focal weakness and headaches. Negative for sensory change, speech change, seizures and loss of consciousness.           OBJECTIVE:     Vital signs:   /88 (BP Location: Right arm, Patient Position: Sitting)   Ht 5' 10" (1.778 m)   Wt 96.2 kg (212 lb)   BMI 30.42 kg/m²      Physical Examination:  General: Well nourished w/o distress  Pulm: CTA bilaterally, normal work of breathing  CV: S1, S2 w/o murmurs or gallops; no edema noted  GI: Soft, non-tender to palpation,no masses on palpation  MSK: no gross deformities of bilateral UE & LE's  Neuro: AAOx4; LUE motor strength 0/5, RUE 5/5, LLE 0/5, RLE 5/5; CBN II-XII grossly intact, no dysarthria, no facial asymmetry  Psych: Cooperative; appropriate mood and affect, " answers all questions appropriately       Current Outpatient Medications   Medication Instructions    acetaminophen (TYLENOL) 325 MG tablet 2 tablets, Oral    amitriptyline (ELAVIL) 25 MG tablet No dose, route, or frequency recorded.    amLODIPine (NORVASC) 5 mg, Oral    ascorbic acid, vitamin C, (VITAMIN C) 500 MG tablet 2 tablets, Oral    aspirin (ECOTRIN) 81 MG EC tablet 1 tablet, Oral    atorvastatin (LIPITOR) 80 mg, Oral    clopidogreL (PLAVIX) 75 mg tablet 1 tablet, Oral    co-enzyme Q-10 30 mg, Oral, 3 times daily    cyanocobalamin, vitamin B-12, 1,000 mcg TbSR 5 tablets, Oral    cyclobenzaprine (FLEXERIL) 10 mg, Oral, 3 times daily    DULoxetine (CYMBALTA) 60 MG capsule Oral    ezetimibe (ZETIA) 10 mg, Oral    folic acid (FOLVITE) 1 MG tablet 1 tablet, Oral    HYDROcodone-acetaminophen (NORCO)  mg per tablet 1 tablet, Oral, 2 times daily    lisinopriL 10 mg, Oral    melatonin (MELATIN) 3 mg tablet 2 tablets, Oral    meloxicam (MOBIC) 15 mg, Oral    metoprolol succinate (TOPROL-XL) 25 MG 24 hr tablet 1 tablet, Oral    omega-3 fatty acids-fish oil (FISH OIL) 340-1,000 mg Cap 1 capsule, Oral, Every morning    pantoprazole (PROTONIX) 40 MG tablet 1 tablet, Oral    thiamine 100 MG tablet 1 tablet, Oral    vitamin D (VITAMIN D3) 1000 units Tab 2 tablets, Oral         ASSESSMENT & PLAN:   Hx of CVA 2/5/23  Severe right carotid artery stenosis vs occulusion  -plan for angiogram this week to evaluate right carotid artery;  if artery stenotic vs occlusive, then potential candidate for right CEA with CV surgery      HTN  HLD  Alcohol abuse Hx  -management per PCP  -continue strict medication compliance  -continue strict alcohol cessation      Rj Saini,   [facility] PGY3

## 2023-05-04 ENCOUNTER — HOSPITAL ENCOUNTER (OUTPATIENT)
Dept: INTERVENTIONAL RADIOLOGY/VASCULAR | Facility: HOSPITAL | Age: 58
Discharge: HOME | End: 2023-05-04
Attending: PSYCHIATRY & NEUROLOGY
Payer: COMMERCIAL

## 2023-05-04 ENCOUNTER — HOSPITAL ENCOUNTER (OUTPATIENT)
Dept: RADIOLOGY | Facility: HOSPITAL | Age: 58
Discharge: HOME | End: 2023-05-04
Attending: PSYCHIATRY & NEUROLOGY
Payer: COMMERCIAL

## 2023-05-04 ENCOUNTER — TELEPHONE (OUTPATIENT)
Dept: CARDIAC SURGERY | Facility: CLINIC | Age: 58
End: 2023-05-04
Payer: COMMERCIAL

## 2023-05-04 VITALS
OXYGEN SATURATION: 95 % | HEART RATE: 74 BPM | WEIGHT: 213.63 LBS | TEMPERATURE: 99 F | BODY MASS INDEX: 30.58 KG/M2 | SYSTOLIC BLOOD PRESSURE: 130 MMHG | RESPIRATION RATE: 15 BRPM | HEIGHT: 70 IN | DIASTOLIC BLOOD PRESSURE: 83 MMHG

## 2023-05-04 DIAGNOSIS — I63.9 CEREBROVASCULAR ACCIDENT (CVA), UNSPECIFIED MECHANISM: ICD-10-CM

## 2023-05-04 LAB
ANION GAP SERPL CALC-SCNC: 6 MEQ/L
BASOPHILS # BLD AUTO: 0.03 X10(3)/MCL
BASOPHILS NFR BLD AUTO: 0.5 %
BUN SERPL-MCNC: 7.5 MG/DL (ref 8.4–25.7)
CALCIUM SERPL-MCNC: 9 MG/DL (ref 8.4–10.2)
CHLORIDE SERPL-SCNC: 110 MMOL/L (ref 98–107)
CO2 SERPL-SCNC: 24 MMOL/L (ref 22–29)
CREAT SERPL-MCNC: 0.79 MG/DL (ref 0.73–1.18)
CREAT/UREA NIT SERPL: 9
EOSINOPHIL # BLD AUTO: 0.05 X10(3)/MCL (ref 0–0.9)
EOSINOPHIL NFR BLD AUTO: 0.9 %
ERYTHROCYTE [DISTWIDTH] IN BLOOD BY AUTOMATED COUNT: 13.4 % (ref 11.5–17)
GFR SERPLBLD CREATININE-BSD FMLA CKD-EPI: >60 MLS/MIN/1.73/M2
GLUCOSE SERPL-MCNC: 104 MG/DL (ref 74–100)
HCT VFR BLD AUTO: 43.9 % (ref 42–52)
HGB BLD-MCNC: 14.6 G/DL (ref 14–18)
IMM GRANULOCYTES # BLD AUTO: 0.01 X10(3)/MCL (ref 0–0.04)
IMM GRANULOCYTES NFR BLD AUTO: 0.2 %
INR BLD: 0.93 (ref 0–1.3)
LYMPHOCYTES # BLD AUTO: 1.88 X10(3)/MCL (ref 0.6–4.6)
LYMPHOCYTES NFR BLD AUTO: 32.4 %
MCH RBC QN AUTO: 29.9 PG (ref 27–31)
MCHC RBC AUTO-ENTMCNC: 33.3 G/DL (ref 33–36)
MCV RBC AUTO: 90 FL (ref 80–94)
MONOCYTES # BLD AUTO: 0.47 X10(3)/MCL (ref 0.1–1.3)
MONOCYTES NFR BLD AUTO: 8.1 %
NEUTROPHILS # BLD AUTO: 3.36 X10(3)/MCL (ref 2.1–9.2)
NEUTROPHILS NFR BLD AUTO: 57.9 %
NRBC BLD AUTO-RTO: 0 %
PLATELET # BLD AUTO: 235 X10(3)/MCL (ref 130–400)
PMV BLD AUTO: 11 FL (ref 7.4–10.4)
POTASSIUM SERPL-SCNC: 4.3 MMOL/L (ref 3.5–5.1)
PROTHROMBIN TIME: 12.4 SECONDS (ref 12.5–14.5)
RBC # BLD AUTO: 4.88 X10(6)/MCL (ref 4.7–6.1)
SODIUM SERPL-SCNC: 140 MMOL/L (ref 136–145)
WBC # SPEC AUTO: 5.8 X10(3)/MCL (ref 4.5–11.5)

## 2023-05-04 PROCEDURE — 85025 COMPLETE CBC W/AUTO DIFF WBC: CPT | Performed by: PSYCHIATRY & NEUROLOGY

## 2023-05-04 PROCEDURE — 36226 PLACE CATH VERTEBRAL ART: CPT | Mod: 51,50,, | Performed by: PSYCHIATRY & NEUROLOGY

## 2023-05-04 PROCEDURE — 85610 PROTHROMBIN TIME: CPT | Performed by: PSYCHIATRY & NEUROLOGY

## 2023-05-04 PROCEDURE — C1894 INTRO/SHEATH, NON-LASER: HCPCS | Performed by: PSYCHIATRY & NEUROLOGY

## 2023-05-04 PROCEDURE — 36222 PR ANGIO XTRCRANL ART+/- CERVIOCEREBRAL ARCH, CAROTID/INNOM ART, SELECTV CATH , S&I: ICD-10-PCS | Mod: 59,RT,, | Performed by: PSYCHIATRY & NEUROLOGY

## 2023-05-04 PROCEDURE — C1760 CLOSURE DEV, VASC: HCPCS | Performed by: PSYCHIATRY & NEUROLOGY

## 2023-05-04 PROCEDURE — 25000003 PHARM REV CODE 250: Performed by: PSYCHIATRY & NEUROLOGY

## 2023-05-04 PROCEDURE — 25500020 PHARM REV CODE 255: Performed by: PSYCHIATRY & NEUROLOGY

## 2023-05-04 PROCEDURE — 36222 PLACE CATH CAROTID/INOM ART: CPT | Mod: 59,RT,, | Performed by: PSYCHIATRY & NEUROLOGY

## 2023-05-04 PROCEDURE — 80048 BASIC METABOLIC PNL TOTAL CA: CPT | Performed by: PSYCHIATRY & NEUROLOGY

## 2023-05-04 PROCEDURE — 36224 PR ANGIO INTRCRNL ART +/- CERVIOCEREBRAL ARCH, INTRNL CAROTID ART, SELECTV CATH ,S&I: ICD-10-PCS | Mod: LT,,, | Performed by: PSYCHIATRY & NEUROLOGY

## 2023-05-04 PROCEDURE — C1769 GUIDE WIRE: HCPCS | Performed by: PSYCHIATRY & NEUROLOGY

## 2023-05-04 PROCEDURE — 99152 MOD SED SAME PHYS/QHP 5/>YRS: CPT | Performed by: PSYCHIATRY & NEUROLOGY

## 2023-05-04 PROCEDURE — 36226 PR ANGIO VERTEBRAL ARTERY +/- CERVIOCEREBRAL ARCH, VERTEBRAL ART, SELECTV CATH,S&I: ICD-10-PCS | Mod: 51,50,, | Performed by: PSYCHIATRY & NEUROLOGY

## 2023-05-04 PROCEDURE — 99152 MOD SED SAME PHYS/QHP 5/>YRS: CPT | Mod: ,,, | Performed by: PSYCHIATRY & NEUROLOGY

## 2023-05-04 PROCEDURE — 36224 PLACE CATH CAROTD ART: CPT | Mod: LT,,, | Performed by: PSYCHIATRY & NEUROLOGY

## 2023-05-04 PROCEDURE — C1887 CATHETER, GUIDING: HCPCS | Performed by: PSYCHIATRY & NEUROLOGY

## 2023-05-04 PROCEDURE — 36226 PLACE CATH VERTEBRAL ART: CPT | Mod: 50 | Performed by: PSYCHIATRY & NEUROLOGY

## 2023-05-04 PROCEDURE — 36222 PLACE CATH CAROTID/INOM ART: CPT | Mod: 59,RT | Performed by: PSYCHIATRY & NEUROLOGY

## 2023-05-04 PROCEDURE — 36224 PLACE CATH CAROTD ART: CPT | Mod: LT | Performed by: PSYCHIATRY & NEUROLOGY

## 2023-05-04 PROCEDURE — 63600175 PHARM REV CODE 636 W HCPCS: Performed by: PSYCHIATRY & NEUROLOGY

## 2023-05-04 PROCEDURE — 36224 PLACE CATH CAROTD ART: CPT | Mod: 50

## 2023-05-04 PROCEDURE — 99152 PR MOD CONSCIOUS SEDATION, SAME PHYS, 5+ YRS, FIRST 15 MIN: ICD-10-PCS | Mod: ,,, | Performed by: PSYCHIATRY & NEUROLOGY

## 2023-05-04 PROCEDURE — 99153 MOD SED SAME PHYS/QHP EA: CPT | Performed by: PSYCHIATRY & NEUROLOGY

## 2023-05-04 PROCEDURE — 70450 CT HEAD/BRAIN W/O DYE: CPT | Mod: TC

## 2023-05-04 RX ORDER — MIDAZOLAM HYDROCHLORIDE 1 MG/ML
INJECTION INTRAMUSCULAR; INTRAVENOUS
Status: COMPLETED | OUTPATIENT
Start: 2023-05-04 | End: 2023-05-04

## 2023-05-04 RX ORDER — HEPARIN SOD,PORCINE/0.9 % NACL 1000/500ML
INTRAVENOUS SOLUTION INTRAVENOUS
Status: COMPLETED | OUTPATIENT
Start: 2023-05-04 | End: 2023-05-04

## 2023-05-04 RX ORDER — LIDOCAINE HYDROCHLORIDE 20 MG/ML
INJECTION, SOLUTION INFILTRATION; PERINEURAL
Status: COMPLETED | OUTPATIENT
Start: 2023-05-04 | End: 2023-05-04

## 2023-05-04 RX ORDER — FENTANYL CITRATE 50 UG/ML
INJECTION, SOLUTION INTRAMUSCULAR; INTRAVENOUS
Status: COMPLETED | OUTPATIENT
Start: 2023-05-04 | End: 2023-05-04

## 2023-05-04 RX ORDER — ACETAMINOPHEN, DIPHENHYDRAMINE HCL, PHENYLEPHRINE HCL 325; 25; 5 MG/1; MG/1; MG/1
10 TABLET ORAL NIGHTLY
COMMUNITY

## 2023-05-04 RX ORDER — LANOLIN ALCOHOL/MO/W.PET/CERES
100 CREAM (GRAM) TOPICAL DAILY
COMMUNITY

## 2023-05-04 RX ORDER — SODIUM CHLORIDE 9 MG/ML
INJECTION, SOLUTION INTRAVENOUS CONTINUOUS
Status: DISCONTINUED | OUTPATIENT
Start: 2023-05-04 | End: 2023-05-05 | Stop reason: HOSPADM

## 2023-05-04 RX ORDER — EPINEPHRINE 0.22MG
100 AEROSOL WITH ADAPTER (ML) INHALATION DAILY
COMMUNITY

## 2023-05-04 RX ORDER — ASCORBIC ACID 125 MG
5 TABLET,CHEWABLE ORAL DAILY
COMMUNITY

## 2023-05-04 RX ADMIN — LIDOCAINE HYDROCHLORIDE 5 ML: 20 INJECTION, SOLUTION INFILTRATION; PERINEURAL at 10:05

## 2023-05-04 RX ADMIN — Medication 2000 ML: at 10:05

## 2023-05-04 RX ADMIN — MIDAZOLAM 1 MG: 1 INJECTION INTRAMUSCULAR; INTRAVENOUS at 10:05

## 2023-05-04 RX ADMIN — IOPAMIDOL 100 ML: 755 INJECTION, SOLUTION INTRAVENOUS at 10:05

## 2023-05-04 RX ADMIN — FENTANYL CITRATE 25 MCG: 50 INJECTION, SOLUTION INTRAMUSCULAR; INTRAVENOUS at 10:05

## 2023-05-04 NOTE — H&P
Pre-Operative History and Physical   Interventional Neurology    Vamsi Mcnair is a 57 y.o. male here for DSA.    ROS:  Review of Systems   All other systems reviewed and are negative.     Past Medical History:   Diagnosis Date    CVA (cerebral vascular accident)     Depression     Hypertension     Mixed hyperlipidemia      Past Surgical History:   Procedure Laterality Date    HERNIA REPAIR  18 years    TONSILLECTOMY  55 years    VASECTOMY  23 years     Family History   Problem Relation Age of Onset    Arthritis Mother     Hearing loss Mother         Wears hearing aids    Heart disease Mother         Blockage    Stroke Mother     Arthritis Father     Diabetes Father     Hearing loss Father         Wears hearing aids    Heart disease Father         Heart attacks, blockages    Kidney disease Father     Vision loss Father         Diabetic    Heart disease Maternal Grandfather         Valve replacement, blockage    Early death Maternal Grandmother     Cancer Paternal Grandfather     Stroke Paternal Grandmother     Alcohol abuse Brother     Arthritis Brother     Alcohol abuse Brother     Cancer Brother         Skin cancer    Arthritis Brother     Asthma Maternal Aunt      Review of patient's allergies indicates:  No Known Allergies    Current Outpatient Medications:     amitriptyline (ELAVIL) 25 MG tablet, Take 25 mg by mouth every evening., Disp: , Rfl:     amLODIPine (NORVASC) 5 MG tablet, Take 5 mg by mouth every morning., Disp: , Rfl:     ascorbic acid, vitamin C, (VITAMIN C) 500 MG tablet, Take 2 tablets by mouth once daily., Disp: , Rfl:     aspirin (ECOTRIN) 81 MG EC tablet, Take 81 mg by mouth nightly., Disp: , Rfl:     atorvastatin (LIPITOR) 40 MG tablet, Take 80 mg by mouth nightly., Disp: , Rfl:     clopidogreL (PLAVIX) 75 mg tablet, Take 1 tablet by mouth once daily., Disp: , Rfl:     coenzyme Q10 100 mg capsule, Take 100 mg by mouth once daily., Disp: , Rfl:     cyanocobalamin, vitamin B-12, 5,000 mcg  Cap, Take 5 tablets by mouth once daily., Disp: , Rfl:     cyclobenzaprine (FLEXERIL) 10 MG tablet, Take 10 mg by mouth 3 (three) times daily., Disp: , Rfl:     DULoxetine (CYMBALTA) 60 MG capsule, Take 60 mg by mouth nightly., Disp: , Rfl:     ezetimibe (ZETIA) 10 mg tablet, Take 10 mg by mouth once daily., Disp: , Rfl:     folic acid (FOLVITE) 1 MG tablet, Take 1 tablet by mouth once daily., Disp: , Rfl:     HYDROcodone-acetaminophen (NORCO)  mg per tablet, Take 1 tablet by mouth 2 (two) times daily., Disp: , Rfl:     lisinopriL 10 MG tablet, Take 10 mg by mouth once daily., Disp: , Rfl:     melatonin 10 mg Tab, Take 10 mg by mouth nightly., Disp: , Rfl:     meloxicam (MOBIC) 15 MG tablet, Take 15 mg by mouth nightly., Disp: , Rfl:     metoprolol succinate (TOPROL-XL) 25 MG 24 hr tablet, Take 25 mg by mouth once daily., Disp: , Rfl:     mv-mn/herbal complex no.120 (SUPER URINOZINC PROSTATE FORM. ORAL), Take 1 tablet by mouth once daily., Disp: , Rfl:     omega-3 fatty acids-fish oil (FISH OIL) 340-1,000 mg Cap, Take 1 capsule by mouth every morning., Disp: , Rfl:     pantoprazole (PROTONIX) 40 MG tablet, Take 1 tablet by mouth once daily., Disp: , Rfl:     thiamine 100 MG tablet, Take 100 mg by mouth once daily., Disp: , Rfl:     vitamin D (VITAMIN D3) 1000 units Tab, Take 2 tablets by mouth once daily., Disp: , Rfl:     Current Facility-Administered Medications:     0.9%  NaCl infusion, , Intravenous, Continuous, Anthony Parrish MD  Outpatient Medications Marked as Taking for the 5/4/23 encounter (Hospital Encounter) with [facility]   Medication Sig Dispense Refill    amitriptyline (ELAVIL) 25 MG tablet Take 25 mg by mouth every evening.      amLODIPine (NORVASC) 5 MG tablet Take 5 mg by mouth every morning.      ascorbic acid, vitamin C, (VITAMIN C) 500 MG tablet Take 2 tablets by mouth once daily.      aspirin (ECOTRIN) 81 MG EC tablet Take 81 mg by mouth nightly.      atorvastatin (LIPITOR) 40 MG tablet  Take 80 mg by mouth nightly.      clopidogreL (PLAVIX) 75 mg tablet Take 1 tablet by mouth once daily.      coenzyme Q10 100 mg capsule Take 100 mg by mouth once daily.      cyanocobalamin, vitamin B-12, 5,000 mcg Cap Take 5 tablets by mouth once daily.      cyclobenzaprine (FLEXERIL) 10 MG tablet Take 10 mg by mouth 3 (three) times daily.      DULoxetine (CYMBALTA) 60 MG capsule Take 60 mg by mouth nightly.      ezetimibe (ZETIA) 10 mg tablet Take 10 mg by mouth once daily.      folic acid (FOLVITE) 1 MG tablet Take 1 tablet by mouth once daily.      HYDROcodone-acetaminophen (NORCO)  mg per tablet Take 1 tablet by mouth 2 (two) times daily.      lisinopriL 10 MG tablet Take 10 mg by mouth once daily.      melatonin 10 mg Tab Take 10 mg by mouth nightly.      meloxicam (MOBIC) 15 MG tablet Take 15 mg by mouth nightly.      metoprolol succinate (TOPROL-XL) 25 MG 24 hr tablet Take 25 mg by mouth once daily.      mv-mn/herbal complex no.120 (SUPER URINOZINC PROSTATE FORM. ORAL) Take 1 tablet by mouth once daily.      omega-3 fatty acids-fish oil (FISH OIL) 340-1,000 mg Cap Take 1 capsule by mouth every morning.      pantoprazole (PROTONIX) 40 MG tablet Take 1 tablet by mouth once daily.      thiamine 100 MG tablet Take 100 mg by mouth once daily.      vitamin D (VITAMIN D3) 1000 units Tab Take 2 tablets by mouth once daily.       Social History     Tobacco Use    Smoking status: Former     Types: Cigarettes    Smokeless tobacco: Never   Substance Use Topics    Alcohol use: Not Currently    Drug use: Yes     Frequency: 14.0 times per week     Types: Hydrocodone     Comment: Pain management         Vitals:    05/04/23 0650   BP: 120/83   Pulse: 87   Temp: 98.6 °F (37 °C)     General: Well nourished w/o distress  Pulm: CTA bilaterally, normal work of breathing  CV: S1, S2 w/o murmurs or gallops; no edema noted  GI: Soft, non-tender to palpation,no masses on palpation  MSK: no gross deformities of bilateral UE &  LE's  Neuro: AAOx4; LUE motor strength 0/5, RUE 5/5, LLE 0/5, RLE 5/5; CBN II-XII grossly intact, no dysarthria, no facial asymmetry  Psych: Cooperative; appropriate mood and affect, answers all questions appropriately        Assessment: Carotid Stenosis    Plan: DSA    I have discussed the risks, benefits, indications, and alternatives of the procedure in detail.  The patient verbalizes her understanding.  All questions answered.  Consents signed.  The patient agrees to proceed to proceed as planned.

## 2023-05-04 NOTE — TELEPHONE ENCOUNTER
Pt had carotid angiogram done today per Dr. Parrish, spouse called to report Dr. Patterson called Dr. Christie and Dr. Parrish cancelled CTA neck scheduled for Monday.  Notified Dr. Christie and states we can review angiogram.  Inst spouse will call back once MD reviews if decides to schedule surgery.  Verb understanding.   ----- Message from Evita Calhoun sent at 5/4/2023 10:12 AM CDT -----  Regarding: questions  Contact: pts wife Saundra [phone]  Please return the call about upcoming testing for Dr Christie

## 2023-05-04 NOTE — OP NOTE
[facility]                       [address] [place]                      [place] [address]    PATIENT NAME:      LUDWIG CROCKETT  YOB: 1965  CSN:               [id number]  MRN:               [id number]  ADMIT DATE:        05/04/2023 06:44:07  PHYSICIAN:         Anthony Parrish MD                          OPERATIVE REPORT      DATE OF SURGERY:    05/04/2023 00:00:00    SURGEON:  Anthony Parrish MD    PREOPERATIVE DIAGNOSIS:  Right common carotid artery stenosis.    POSTOPERATIVE DIAGNOSIS:  Right common carotid artery occlusion.    PROCEDURE PERFORMED:  Cerebral angiogram with catheter insertion in the   following arteries:  Right subclavian, right vertebral, right common carotid,   left common carotid, left internal carotid, left subclavian, left vertebral.    LEVEL OF SEDATION:  Conscious sedation.  Sedation administered by independent   trained observer under attending supervision with continuous monitoring of the   patient's level of consciousness and physiologic status.  Total intraservice   sedation time 50 minutes.    COMPLICATIONS:  None.    DETAILED DESCRIPTION:  Following informed consent, the patient prepped draped in   a sterile fashion.  I infiltrated the right groin with local anesthetic and   punctured the right femoral artery placing a 5-Polish sheath without incident.    I introduced my catheter and wire and advanced them to the aortic arch.  I   selected the right common carotid artery.  I introduced my catheter and wire and   advanced them to the aortic arch.  I selected the right subclavian artery.    Angiographic images demonstrated no stenosis at the origin of the right   vertebral artery.  I selected the right vertebral artery.  Cerebral AP and   lateral views demonstrated collateral flow via a patent right posterior   communicating artery to the anterior cerebral circulation.  I then selected the   right common  carotid artery.  Angiographic images demonstrated complete   occlusion of the common carotid artery.  I then selected the left common carotid   artery.  Angiographic images demonstrated mild stenosis of the carotid   bifurcation.  I selected the left internal carotid artery.  Cerebral AP and   lateral views demonstrated a patent anterior communicating artery with   collateralization to the right anterior cerebral circulation.  I then selected   the left subclavian artery.  Angiographic images demonstrated no stenosis at the   origin of the left vertebral artery.  I selected the left vertebral artery.    Cerebral AP and lateral views again demonstrated the right posterior   communicating artery collateralization to the right anterior cerebral   circulation.  I removed the catheter.  Hemostasis was achieved using a M[employer]   closure device.  The patient tolerated the procedure well without apparent   complication.        ______________________________  Anthony Parrish MD    DEP/AQS  DD:  05/04/2023  Time:  10:33AM  DT:  05/04/2023  Time:  11:53AM  Job #:  [id number]      OPERATIVE REPORT

## 2023-05-10 DIAGNOSIS — I65.23 BILATERAL CAROTID ARTERY STENOSIS: Primary | ICD-10-CM

## 2023-06-01 ENCOUNTER — OFFICE VISIT (OUTPATIENT)
Dept: NEUROLOGY | Facility: CLINIC | Age: 58
End: 2023-06-01
Payer: COMMERCIAL

## 2023-06-01 VITALS
SYSTOLIC BLOOD PRESSURE: 132 MMHG | HEART RATE: 95 BPM | BODY MASS INDEX: 30.49 KG/M2 | DIASTOLIC BLOOD PRESSURE: 90 MMHG | HEIGHT: 70 IN | WEIGHT: 213 LBS

## 2023-06-01 DIAGNOSIS — I65.21 RIGHT CAROTID ARTERY OCCLUSION: ICD-10-CM

## 2023-06-01 DIAGNOSIS — I63.9 CEREBROVASCULAR ACCIDENT (CVA), UNSPECIFIED MECHANISM: Primary | ICD-10-CM

## 2023-06-01 PROCEDURE — 3080F DIAST BP >= 90 MM HG: CPT | Mod: CPTII,S$GLB,, | Performed by: NURSE PRACTITIONER

## 2023-06-01 PROCEDURE — 99214 OFFICE O/P EST MOD 30 MIN: CPT | Mod: S$GLB,,, | Performed by: NURSE PRACTITIONER

## 2023-06-01 PROCEDURE — 1159F PR MEDICATION LIST DOCUMENTED IN MEDICAL RECORD: ICD-10-PCS | Mod: CPTII,S$GLB,, | Performed by: NURSE PRACTITIONER

## 2023-06-01 PROCEDURE — 3080F PR MOST RECENT DIASTOLIC BLOOD PRESSURE >= 90 MM HG: ICD-10-PCS | Mod: CPTII,S$GLB,, | Performed by: NURSE PRACTITIONER

## 2023-06-01 PROCEDURE — 1159F MED LIST DOCD IN RCRD: CPT | Mod: CPTII,S$GLB,, | Performed by: NURSE PRACTITIONER

## 2023-06-01 PROCEDURE — 1160F RVW MEDS BY RX/DR IN RCRD: CPT | Mod: CPTII,S$GLB,, | Performed by: NURSE PRACTITIONER

## 2023-06-01 PROCEDURE — 99214 PR OFFICE/OUTPT VISIT, EST, LEVL IV, 30-39 MIN: ICD-10-PCS | Mod: S$GLB,,, | Performed by: NURSE PRACTITIONER

## 2023-06-01 PROCEDURE — 3075F PR MOST RECENT SYSTOLIC BLOOD PRESS GE 130-139MM HG: ICD-10-PCS | Mod: CPTII,S$GLB,, | Performed by: NURSE PRACTITIONER

## 2023-06-01 PROCEDURE — 4010F ACE/ARB THERAPY RXD/TAKEN: CPT | Mod: CPTII,S$GLB,, | Performed by: NURSE PRACTITIONER

## 2023-06-01 PROCEDURE — 3008F PR BODY MASS INDEX (BMI) DOCUMENTED: ICD-10-PCS | Mod: CPTII,S$GLB,, | Performed by: NURSE PRACTITIONER

## 2023-06-01 PROCEDURE — 4010F PR ACE/ARB THEARPY RXD/TAKEN: ICD-10-PCS | Mod: CPTII,S$GLB,, | Performed by: NURSE PRACTITIONER

## 2023-06-01 PROCEDURE — 99999 PR PBB SHADOW E&M-EST. PATIENT-LVL V: ICD-10-PCS | Mod: PBBFAC,,, | Performed by: NURSE PRACTITIONER

## 2023-06-01 PROCEDURE — 3075F SYST BP GE 130 - 139MM HG: CPT | Mod: CPTII,S$GLB,, | Performed by: NURSE PRACTITIONER

## 2023-06-01 PROCEDURE — 3008F BODY MASS INDEX DOCD: CPT | Mod: CPTII,S$GLB,, | Performed by: NURSE PRACTITIONER

## 2023-06-01 PROCEDURE — 1160F PR REVIEW ALL MEDS BY PRESCRIBER/CLIN PHARMACIST DOCUMENTED: ICD-10-PCS | Mod: CPTII,S$GLB,, | Performed by: NURSE PRACTITIONER

## 2023-06-01 PROCEDURE — 99999 PR PBB SHADOW E&M-EST. PATIENT-LVL V: CPT | Mod: PBBFAC,,, | Performed by: NURSE PRACTITIONER

## 2023-06-01 NOTE — PROGRESS NOTES
Subjective:      Patient ID: Vamsi Mcnair is a 57 y.o. male.    Chief Complaint:  Stroke (F/u angiogram. Pt denies swelling, drainage or pain at site. Pt denies blurred vision or weakness. Pt states HA but may be due to clots in right side of neck. Wife states patient has had slurred speech.)      History of Present Illness  An office visit of an established patient, 57 years old. Prior to the patient's arrival on the same day, the provider spends 10 minutes reviewing the results of lab work, hospital stay and physician notes. Once in the exam room with the patient, the provider then spends 20 minutes in the room with the member performing a history and exam as well as reviewing the test results and recommendations with the patient. After leaving the exam room, the provider then spends an additional 5 minutes completing the electronic health record. The total time spent that day caring for the member is 35 minutes, and this time--including the breakdown--is documented in the medical record.      Patient presents for follow up post cerebral angiogram. Cerebral angiogram results reviewed. Showed right common carotid artery occlusion with good collateral flow. Today, patient denies any drainage, pain or swelling at femoral access site. Denies any new onset of numbness, tingling or weakness. Still with left sided weakness. In wheelchair today. Patient's wife reports patient has slurred speech at times. Patient has a headache at times. Denies any blurred vision or weakness. Reports still working with therapy and is able to walk short distances at the present time. Still no movement to left hand. Does lift left arm from shoulder.        DATE OF SURGERY:    05/04/2023 00:00:00     SURGEON:  Anthony Parrish MD     PREOPERATIVE DIAGNOSIS:  Right common carotid artery stenosis.     POSTOPERATIVE DIAGNOSIS:  Right common carotid artery occlusion.     PROCEDURE PERFORMED:  Cerebral angiogram with catheter insertion in the    following arteries:  Right subclavian, right vertebral, right common carotid,   left common carotid, left internal carotid, left subclavian, left vertebral.    Past Medical History:   Diagnosis Date    CVA (cerebral vascular accident)     Depression     Hypertension     Mixed hyperlipidemia        Past Surgical History:   Procedure Laterality Date    HERNIA REPAIR  18 years    TONSILLECTOMY  55 years    VASECTOMY  23 years       Family History   Problem Relation Age of Onset    Arthritis Mother     Hearing loss Mother         Wears hearing aids    Heart disease Mother         Blockage    Stroke Mother     Arthritis Father     Diabetes Father     Hearing loss Father         Wears hearing aids    Heart disease Father         Heart attacks, blockages    Kidney disease Father     Vision loss Father         Diabetic    Heart disease Maternal Grandfather         Valve replacement, blockage    Early death Maternal Grandmother     Cancer Paternal Grandfather     Stroke Paternal Grandmother     Alcohol abuse Brother     Arthritis Brother     Alcohol abuse Brother     Cancer Brother         Skin cancer    Arthritis Brother     Asthma Maternal Aunt        Social History     Socioeconomic History    Marital status:    Tobacco Use    Smoking status: Former     Types: Cigarettes    Smokeless tobacco: Never   Substance and Sexual Activity    Alcohol use: Not Currently    Drug use: Yes     Frequency: 14.0 times per week     Types: Hydrocodone     Comment: Pain management    Sexual activity: Yes     Partners: Female     Birth control/protection: None     Comment: Hysterectomy       Current Outpatient Medications   Medication Sig Dispense Refill    amitriptyline (ELAVIL) 25 MG tablet Take 25 mg by mouth every evening.      amLODIPine (NORVASC) 5 MG tablet Take 5 mg by mouth every morning.      ascorbic acid, vitamin C, (VITAMIN C) 500 MG tablet Take 2 tablets by mouth once daily.      aspirin (ECOTRIN) 81 MG EC tablet Take  81 mg by mouth nightly.      atorvastatin (LIPITOR) 40 MG tablet Take 80 mg by mouth nightly.      clopidogreL (PLAVIX) 75 mg tablet Take 1 tablet by mouth once daily.      coenzyme Q10 100 mg capsule Take 100 mg by mouth once daily.      cyanocobalamin, vitamin B-12, 5,000 mcg Cap Take 5 tablets by mouth once daily.      cyclobenzaprine (FLEXERIL) 10 MG tablet Take 10 mg by mouth 3 (three) times daily.      DULoxetine (CYMBALTA) 60 MG capsule Take 60 mg by mouth nightly.      ezetimibe (ZETIA) 10 mg tablet Take 10 mg by mouth once daily.      folic acid (FOLVITE) 1 MG tablet Take 1 tablet by mouth once daily.      HYDROcodone-acetaminophen (NORCO)  mg per tablet Take 1 tablet by mouth 2 (two) times daily.      lisinopriL 10 MG tablet Take 10 mg by mouth once daily.      melatonin 10 mg Tab Take 10 mg by mouth nightly.      meloxicam (MOBIC) 15 MG tablet Take 15 mg by mouth nightly.      metoprolol succinate (TOPROL-XL) 25 MG 24 hr tablet Take 25 mg by mouth once daily.      mv-mn/herbal complex no.120 (SUPER URINOZINC PROSTATE FORM. ORAL) Take 1 tablet by mouth once daily.      omega-3 fatty acids-fish oil (FISH OIL) 340-1,000 mg Cap Take 1 capsule by mouth every morning.      pantoprazole (PROTONIX) 40 MG tablet Take 1 tablet by mouth once daily.      thiamine 100 MG tablet Take 100 mg by mouth once daily.      vitamin D (VITAMIN D3) 1000 units Tab Take 2 tablets by mouth once daily.       No current facility-administered medications for this visit.       Review of patient's allergies indicates:  No Known Allergies     Vitals:    06/01/23 1039   BP: (!) 132/90   Pulse: 95       Review of Systems  Review of Systems   Neurological:  Positive for weakness and headaches.   All other systems reviewed and are negative.  Objective:     Neurologic Exam     Mental Status   Oriented to person, place, and time.   Speech: speech is normal   Level of consciousness: alert    Cranial Nerves     CN VII   Left facial  weakness: peripheral    Motor Exam   Muscle bulk: normalLeft upper extremity weakness; able to lift left arm from shoulder, no left hand . AFO on left lower extremity with very minimal weakness to left leg     Sensory Exam   Light touch normal.     Gait, Coordination, and Reflexes In wheelchair today     Physical Exam  Vitals reviewed.   Cardiovascular:      Rate and Rhythm: Normal rate and regular rhythm.   Pulmonary:      Effort: Pulmonary effort is normal.   Neurological:      Mental Status: He is oriented to person, place, and time.   Psychiatric:         Speech: Speech normal.        Assessment:     1. Cerebrovascular accident (CVA), unspecified mechanism    2. Right carotid artery occlusion        Plan:   Results of cerebral angiogram reviewed with patient and patient's wife  Reviewed CTA, MRI brain from patient's hospitalization  Continue Plavix and statin  Cardiovascular surgery has planned 6 month follow up with [facility] at that time; does not recommend surgery  BP ok today  Secondary stroke prevention measures discussed. Education provided on signs and symptoms of stroke; advised to call 9-1-1 with any new onset of numbness, tingling or weakness, difficulty with speech or facial droop.

## 2023-11-15 ENCOUNTER — OFFICE VISIT (OUTPATIENT)
Dept: CARDIAC SURGERY | Facility: CLINIC | Age: 58
End: 2023-11-15
Payer: COMMERCIAL

## 2023-11-15 VITALS
DIASTOLIC BLOOD PRESSURE: 77 MMHG | WEIGHT: 230 LBS | SYSTOLIC BLOOD PRESSURE: 111 MMHG | BODY MASS INDEX: 32.93 KG/M2 | HEART RATE: 93 BPM | HEIGHT: 70 IN

## 2023-11-15 DIAGNOSIS — I65.23 BILATERAL CAROTID ARTERY STENOSIS: Primary | ICD-10-CM

## 2023-11-15 PROCEDURE — 3008F PR BODY MASS INDEX (BMI) DOCUMENTED: ICD-10-PCS | Mod: CPTII,,, | Performed by: THORACIC SURGERY (CARDIOTHORACIC VASCULAR SURGERY)

## 2023-11-15 PROCEDURE — 1159F PR MEDICATION LIST DOCUMENTED IN MEDICAL RECORD: ICD-10-PCS | Mod: CPTII,,, | Performed by: THORACIC SURGERY (CARDIOTHORACIC VASCULAR SURGERY)

## 2023-11-15 PROCEDURE — 3044F HG A1C LEVEL LT 7.0%: CPT | Mod: CPTII,,, | Performed by: THORACIC SURGERY (CARDIOTHORACIC VASCULAR SURGERY)

## 2023-11-15 PROCEDURE — 3078F PR MOST RECENT DIASTOLIC BLOOD PRESSURE < 80 MM HG: ICD-10-PCS | Mod: CPTII,,, | Performed by: THORACIC SURGERY (CARDIOTHORACIC VASCULAR SURGERY)

## 2023-11-15 PROCEDURE — 1159F MED LIST DOCD IN RCRD: CPT | Mod: CPTII,,, | Performed by: THORACIC SURGERY (CARDIOTHORACIC VASCULAR SURGERY)

## 2023-11-15 PROCEDURE — 3008F BODY MASS INDEX DOCD: CPT | Mod: CPTII,,, | Performed by: THORACIC SURGERY (CARDIOTHORACIC VASCULAR SURGERY)

## 2023-11-15 PROCEDURE — 4010F ACE/ARB THERAPY RXD/TAKEN: CPT | Mod: CPTII,,, | Performed by: THORACIC SURGERY (CARDIOTHORACIC VASCULAR SURGERY)

## 2023-11-15 PROCEDURE — 3044F PR MOST RECENT HEMOGLOBIN A1C LEVEL <7.0%: ICD-10-PCS | Mod: CPTII,,, | Performed by: THORACIC SURGERY (CARDIOTHORACIC VASCULAR SURGERY)

## 2023-11-15 PROCEDURE — 99214 PR OFFICE/OUTPT VISIT, EST, LEVL IV, 30-39 MIN: ICD-10-PCS | Mod: ,,, | Performed by: THORACIC SURGERY (CARDIOTHORACIC VASCULAR SURGERY)

## 2023-11-15 PROCEDURE — 3074F PR MOST RECENT SYSTOLIC BLOOD PRESSURE < 130 MM HG: ICD-10-PCS | Mod: CPTII,,, | Performed by: THORACIC SURGERY (CARDIOTHORACIC VASCULAR SURGERY)

## 2023-11-15 PROCEDURE — 3074F SYST BP LT 130 MM HG: CPT | Mod: CPTII,,, | Performed by: THORACIC SURGERY (CARDIOTHORACIC VASCULAR SURGERY)

## 2023-11-15 PROCEDURE — 4010F PR ACE/ARB THEARPY RXD/TAKEN: ICD-10-PCS | Mod: CPTII,,, | Performed by: THORACIC SURGERY (CARDIOTHORACIC VASCULAR SURGERY)

## 2023-11-15 PROCEDURE — 99214 OFFICE O/P EST MOD 30 MIN: CPT | Mod: ,,, | Performed by: THORACIC SURGERY (CARDIOTHORACIC VASCULAR SURGERY)

## 2023-11-15 PROCEDURE — 3078F DIAST BP <80 MM HG: CPT | Mod: CPTII,,, | Performed by: THORACIC SURGERY (CARDIOTHORACIC VASCULAR SURGERY)

## 2023-11-15 PROCEDURE — 1160F RVW MEDS BY RX/DR IN RCRD: CPT | Mod: CPTII,,, | Performed by: THORACIC SURGERY (CARDIOTHORACIC VASCULAR SURGERY)

## 2023-11-15 PROCEDURE — 1160F PR REVIEW ALL MEDS BY PRESCRIBER/CLIN PHARMACIST DOCUMENTED: ICD-10-PCS | Mod: CPTII,,, | Performed by: THORACIC SURGERY (CARDIOTHORACIC VASCULAR SURGERY)

## 2023-11-15 RX ORDER — ATORVASTATIN CALCIUM 80 MG/1
80 TABLET, FILM COATED ORAL DAILY
COMMUNITY

## 2023-11-15 NOTE — PROGRESS NOTES
History & Physical    SUBJECTIVE:     History of Present Illness:  The patient is coming for follow-up carotid artery disease.  He has been getting better with time.  He is ambulating currently.  A lot of function has returned.      Chief Complaint   Patient presents with    Follow-up     6 MO F/U W/ CAROTID U/S.  DX: SEVERE RT CAROTID ARTERY STENOSIS W/ MOD LEFT STENOSIS, RT COMMON CAROTID ARTERY OCCLUSION.       Review of patient's allergies indicates:  No Known Allergies    Current Outpatient Medications   Medication Sig Dispense Refill    amitriptyline (ELAVIL) 25 MG tablet Take 25 mg by mouth every evening.      amLODIPine (NORVASC) 5 MG tablet Take 5 mg by mouth every morning.      ascorbic acid, vitamin C, (VITAMIN C) 500 MG tablet Take 2 tablets by mouth once daily.      aspirin (ECOTRIN) 81 MG EC tablet Take 81 mg by mouth nightly.      atorvastatin (LIPITOR) 80 MG tablet Take 80 mg by mouth once daily.      clopidogreL (PLAVIX) 75 mg tablet Take 1 tablet by mouth once daily.      coenzyme Q10 100 mg capsule Take 100 mg by mouth once daily.      cyanocobalamin, vitamin B-12, 5,000 mcg Cap Take 5 tablets by mouth once daily.      cyclobenzaprine (FLEXERIL) 10 MG tablet Take 10 mg by mouth 3 (three) times daily.      DULoxetine (CYMBALTA) 60 MG capsule Take 60 mg by mouth nightly.      ezetimibe (ZETIA) 10 mg tablet Take 10 mg by mouth once daily.      folic acid (FOLVITE) 1 MG tablet Take 1 tablet by mouth once daily.      HYDROcodone-acetaminophen (NORCO)  mg per tablet Take 1 tablet by mouth 2 (two) times daily.      lisinopriL 10 MG tablet Take 10 mg by mouth once daily.      melatonin 10 mg Tab Take 10 mg by mouth nightly.      meloxicam (MOBIC) 15 MG tablet Take 15 mg by mouth nightly.      metoprolol succinate (TOPROL-XL) 25 MG 24 hr tablet Take 25 mg by mouth once daily.      mv-mn/herbal complex no.120 (SUPER URINOZINC PROSTATE FORM. ORAL) Take 1 tablet by mouth once daily.      omega-3 fatty  acids-fish oil (FISH OIL) 340-1,000 mg Cap Take 1 capsule by mouth every morning.      pantoprazole (PROTONIX) 40 MG tablet Take 1 tablet by mouth once daily.      thiamine 100 MG tablet Take 100 mg by mouth once daily.      vitamin D (VITAMIN D3) 1000 units Tab Take 2 tablets by mouth once daily.       No current facility-administered medications for this visit.       Past Medical History:   Diagnosis Date    CVA (cerebral vascular accident)     Depression     Hypertension     Mixed hyperlipidemia      Past Surgical History:   Procedure Laterality Date    HERNIA REPAIR  18 years    TONSILLECTOMY  55 years    VASECTOMY  23 years     Family History   Problem Relation Age of Onset    Arthritis Mother     Hearing loss Mother         Wears hearing aids    Heart disease Mother         Blockage    Stroke Mother     Arthritis Father     Diabetes Father     Hearing loss Father         Wears hearing aids    Heart disease Father         Heart attacks, blockages    Kidney disease Father     Vision loss Father         Diabetic    Heart disease Maternal Grandfather         Valve replacement, blockage    Early death Maternal Grandmother     Cancer Paternal Grandfather     Stroke Paternal Grandmother     Alcohol abuse Brother     Arthritis Brother     Alcohol abuse Brother     Cancer Brother         Skin cancer    Arthritis Brother     Asthma Maternal Aunt      Social History     Tobacco Use    Smoking status: Former     Types: Cigarettes    Smokeless tobacco: Never   Substance Use Topics    Alcohol use: Not Currently    Drug use: Yes     Frequency: 14.0 times per week     Types: Hydrocodone     Comment: Pain management        Review of Systems:  Review of Systems   Constitutional: Negative.    HENT: Negative.     Eyes: Negative.    Respiratory: Negative.     Cardiovascular: Negative.    Gastrointestinal: Negative.    Endocrine: Negative.    Genitourinary: Negative.    Musculoskeletal: Negative.         Claudications   Skin:  "Negative.    Allergic/Immunologic: Negative.    Neurological: Negative.    Hematological: Negative.    Psychiatric/Behavioral: Negative.         OBJECTIVE:     Vital Signs (Most Recent)  Pulse: 93 (11/15/23 1138)  BP: 111/77 (11/15/23 1138)  5' 10" (1.778 m)  104.3 kg (230 lb)     Physical Exam:  Physical Exam  Vitals reviewed.   Constitutional:       Appearance: Normal appearance.   HENT:      Head: Normocephalic and atraumatic.      Nose: Nose normal.      Mouth/Throat:      Mouth: Mucous membranes are dry.      Pharynx: Oropharynx is clear.   Eyes:      Extraocular Movements: Extraocular movements intact.      Conjunctiva/sclera: Conjunctivae normal.      Pupils: Pupils are equal, round, and reactive to light.   Cardiovascular:      Rate and Rhythm: Normal rate and regular rhythm.      Pulses: Normal pulses.   Pulmonary:      Effort: Pulmonary effort is normal.      Breath sounds: Normal breath sounds.   Abdominal:      General: Abdomen is flat.      Palpations: Abdomen is soft.   Musculoskeletal:         General: Normal range of motion.      Cervical back: Neck supple.   Skin:     General: Skin is warm and dry.   Neurological:      General: No focal deficit present.   Psychiatric:         Mood and Affect: Mood normal.         Laboratory:  None      Diagnostic Results:  Less than 40% stenosis on the left internal carotid artery.  Totally occluded right common carotid artery.      ASSESSMENT/PLAN:     Overall recovering very well.  The patient has need follow up in the clinic in 1 year with a carotid ultrasound.  Stable carotid artery disease                "

## 2023-11-21 ENCOUNTER — TELEPHONE (OUTPATIENT)
Dept: NEUROLOGY | Facility: CLINIC | Age: 58
End: 2023-11-21

## 2023-12-11 ENCOUNTER — OFFICE VISIT (OUTPATIENT)
Dept: NEUROLOGY | Facility: CLINIC | Age: 58
End: 2023-12-11
Payer: COMMERCIAL

## 2023-12-11 VITALS
SYSTOLIC BLOOD PRESSURE: 134 MMHG | HEART RATE: 91 BPM | HEIGHT: 70 IN | BODY MASS INDEX: 32.21 KG/M2 | DIASTOLIC BLOOD PRESSURE: 99 MMHG | WEIGHT: 225 LBS

## 2023-12-11 DIAGNOSIS — I63.9 CEREBROVASCULAR ACCIDENT (CVA), UNSPECIFIED MECHANISM: Primary | ICD-10-CM

## 2023-12-11 PROCEDURE — 1159F MED LIST DOCD IN RCRD: CPT | Mod: CPTII,S$GLB,, | Performed by: PSYCHIATRY & NEUROLOGY

## 2023-12-11 PROCEDURE — 3044F PR MOST RECENT HEMOGLOBIN A1C LEVEL <7.0%: ICD-10-PCS | Mod: CPTII,S$GLB,, | Performed by: PSYCHIATRY & NEUROLOGY

## 2023-12-11 PROCEDURE — 3008F BODY MASS INDEX DOCD: CPT | Mod: CPTII,S$GLB,, | Performed by: PSYCHIATRY & NEUROLOGY

## 2023-12-11 PROCEDURE — 99214 OFFICE O/P EST MOD 30 MIN: CPT | Mod: S$GLB,,, | Performed by: PSYCHIATRY & NEUROLOGY

## 2023-12-11 PROCEDURE — 3075F SYST BP GE 130 - 139MM HG: CPT | Mod: CPTII,S$GLB,, | Performed by: PSYCHIATRY & NEUROLOGY

## 2023-12-11 PROCEDURE — 4010F PR ACE/ARB THEARPY RXD/TAKEN: ICD-10-PCS | Mod: CPTII,S$GLB,, | Performed by: PSYCHIATRY & NEUROLOGY

## 2023-12-11 PROCEDURE — 4010F ACE/ARB THERAPY RXD/TAKEN: CPT | Mod: CPTII,S$GLB,, | Performed by: PSYCHIATRY & NEUROLOGY

## 2023-12-11 PROCEDURE — 3075F PR MOST RECENT SYSTOLIC BLOOD PRESS GE 130-139MM HG: ICD-10-PCS | Mod: CPTII,S$GLB,, | Performed by: PSYCHIATRY & NEUROLOGY

## 2023-12-11 PROCEDURE — 3008F PR BODY MASS INDEX (BMI) DOCUMENTED: ICD-10-PCS | Mod: CPTII,S$GLB,, | Performed by: PSYCHIATRY & NEUROLOGY

## 2023-12-11 PROCEDURE — 3044F HG A1C LEVEL LT 7.0%: CPT | Mod: CPTII,S$GLB,, | Performed by: PSYCHIATRY & NEUROLOGY

## 2023-12-11 PROCEDURE — 99999 PR PBB SHADOW E&M-EST. PATIENT-LVL IV: ICD-10-PCS | Mod: PBBFAC,,, | Performed by: PSYCHIATRY & NEUROLOGY

## 2023-12-11 PROCEDURE — 3080F PR MOST RECENT DIASTOLIC BLOOD PRESSURE >= 90 MM HG: ICD-10-PCS | Mod: CPTII,S$GLB,, | Performed by: PSYCHIATRY & NEUROLOGY

## 2023-12-11 PROCEDURE — 99214 PR OFFICE/OUTPT VISIT, EST, LEVL IV, 30-39 MIN: ICD-10-PCS | Mod: S$GLB,,, | Performed by: PSYCHIATRY & NEUROLOGY

## 2023-12-11 PROCEDURE — 3080F DIAST BP >= 90 MM HG: CPT | Mod: CPTII,S$GLB,, | Performed by: PSYCHIATRY & NEUROLOGY

## 2023-12-11 PROCEDURE — 1159F PR MEDICATION LIST DOCUMENTED IN MEDICAL RECORD: ICD-10-PCS | Mod: CPTII,S$GLB,, | Performed by: PSYCHIATRY & NEUROLOGY

## 2023-12-11 PROCEDURE — 99999 PR PBB SHADOW E&M-EST. PATIENT-LVL IV: CPT | Mod: PBBFAC,,, | Performed by: PSYCHIATRY & NEUROLOGY

## 2023-12-11 RX ORDER — OMEPRAZOLE 40 MG/1
40 CAPSULE, DELAYED RELEASE ORAL
COMMUNITY
Start: 2023-12-08

## 2023-12-11 NOTE — PROGRESS NOTES
Neurology Office Visit  Neurology    Vamsi DIANN Mcnair is a 58 y.o. male for f/u.  No new stroke-like episodes.  C/o stiffness in left fingers.  Currently on Flexeril.  Also reports continued difficulty drinking from cup due to mouth weakness.    ROS:  Review of Systems   All other systems reviewed and are negative.     Past Medical History:   Diagnosis Date    CVA (cerebral vascular accident)     Depression     Hypertension     Mixed hyperlipidemia      Past Surgical History:   Procedure Laterality Date    HERNIA REPAIR  18 years    TONSILLECTOMY  55 years    VASECTOMY  23 years     Family History   Problem Relation Age of Onset    Arthritis Mother     Hearing loss Mother         Wears hearing aids    Heart disease Mother         Blockage    Stroke Mother     Arthritis Father     Diabetes Father     Hearing loss Father         Wears hearing aids    Heart disease Father         Heart attacks, blockages    Kidney disease Father     Vision loss Father         Diabetic    Heart disease Maternal Grandfather         Valve replacement, blockage    Early death Maternal Grandmother     Cancer Paternal Grandfather     Stroke Paternal Grandmother     Alcohol abuse Brother     Arthritis Brother     Alcohol abuse Brother     Cancer Brother         Skin cancer    Arthritis Brother     Asthma Maternal Aunt      Review of patient's allergies indicates:  No Known Allergies    Current Outpatient Medications:     amitriptyline (ELAVIL) 25 MG tablet, Take 25 mg by mouth every evening., Disp: , Rfl:     amLODIPine (NORVASC) 5 MG tablet, Take 5 mg by mouth every morning., Disp: , Rfl:     ascorbic acid, vitamin C, (VITAMIN C) 500 MG tablet, Take 2 tablets by mouth once daily., Disp: , Rfl:     aspirin (ECOTRIN) 81 MG EC tablet, Take 81 mg by mouth nightly., Disp: , Rfl:     atorvastatin (LIPITOR) 80 MG tablet, Take 80 mg by mouth once daily., Disp: , Rfl:     clopidogreL (PLAVIX) 75 mg tablet, Take 1 tablet by mouth once daily., Disp: ,  Rfl:     coenzyme Q10 100 mg capsule, Take 100 mg by mouth once daily., Disp: , Rfl:     cyanocobalamin, vitamin B-12, 5,000 mcg Cap, Take 5 tablets by mouth once daily., Disp: , Rfl:     cyclobenzaprine (FLEXERIL) 10 MG tablet, Take 10 mg by mouth 3 (three) times daily., Disp: , Rfl:     DULoxetine (CYMBALTA) 60 MG capsule, Take 60 mg by mouth nightly., Disp: , Rfl:     ezetimibe (ZETIA) 10 mg tablet, Take 10 mg by mouth once daily., Disp: , Rfl:     folic acid (FOLVITE) 1 MG tablet, Take 1 tablet by mouth once daily., Disp: , Rfl:     HYDROcodone-acetaminophen (NORCO)  mg per tablet, Take 1 tablet by mouth 2 (two) times daily., Disp: , Rfl:     lisinopriL 10 MG tablet, Take 10 mg by mouth once daily., Disp: , Rfl:     melatonin 10 mg Tab, Take 10 mg by mouth nightly., Disp: , Rfl:     meloxicam (MOBIC) 15 MG tablet, Take 15 mg by mouth nightly., Disp: , Rfl:     metoprolol succinate (TOPROL-XL) 25 MG 24 hr tablet, Take 25 mg by mouth once daily., Disp: , Rfl:     mv-mn/herbal complex no.120 (SUPER URINOZINC PROSTATE FORM. ORAL), Take 1 tablet by mouth once daily., Disp: , Rfl:     omega-3 fatty acids-fish oil (FISH OIL) 340-1,000 mg Cap, Take 1 capsule by mouth every morning., Disp: , Rfl:     omeprazole (PRILOSEC) 40 MG capsule, Take 40 mg by mouth., Disp: , Rfl:     pantoprazole (PROTONIX) 40 MG tablet, Take 1 tablet by mouth once daily., Disp: , Rfl:     thiamine 100 MG tablet, Take 100 mg by mouth once daily., Disp: , Rfl:     vitamin D (VITAMIN D3) 1000 units Tab, Take 2 tablets by mouth once daily., Disp: , Rfl:   Outpatient Medications Marked as Taking for the 12/11/23 encounter (Office Visit) with Anthony Parrish MD   Medication Sig Dispense Refill    amitriptyline (ELAVIL) 25 MG tablet Take 25 mg by mouth every evening.      amLODIPine (NORVASC) 5 MG tablet Take 5 mg by mouth every morning.      ascorbic acid, vitamin C, (VITAMIN C) 500 MG tablet Take 2 tablets by mouth once daily.      aspirin  (ECOTRIN) 81 MG EC tablet Take 81 mg by mouth nightly.      atorvastatin (LIPITOR) 80 MG tablet Take 80 mg by mouth once daily.      clopidogreL (PLAVIX) 75 mg tablet Take 1 tablet by mouth once daily.      coenzyme Q10 100 mg capsule Take 100 mg by mouth once daily.      cyanocobalamin, vitamin B-12, 5,000 mcg Cap Take 5 tablets by mouth once daily.      cyclobenzaprine (FLEXERIL) 10 MG tablet Take 10 mg by mouth 3 (three) times daily.      DULoxetine (CYMBALTA) 60 MG capsule Take 60 mg by mouth nightly.      ezetimibe (ZETIA) 10 mg tablet Take 10 mg by mouth once daily.      folic acid (FOLVITE) 1 MG tablet Take 1 tablet by mouth once daily.      HYDROcodone-acetaminophen (NORCO)  mg per tablet Take 1 tablet by mouth 2 (two) times daily.      lisinopriL 10 MG tablet Take 10 mg by mouth once daily.      melatonin 10 mg Tab Take 10 mg by mouth nightly.      meloxicam (MOBIC) 15 MG tablet Take 15 mg by mouth nightly.      metoprolol succinate (TOPROL-XL) 25 MG 24 hr tablet Take 25 mg by mouth once daily.      mv-mn/herbal complex no.120 (SUPER URINOZINC PROSTATE FORM. ORAL) Take 1 tablet by mouth once daily.      omega-3 fatty acids-fish oil (FISH OIL) 340-1,000 mg Cap Take 1 capsule by mouth every morning.      omeprazole (PRILOSEC) 40 MG capsule Take 40 mg by mouth.      pantoprazole (PROTONIX) 40 MG tablet Take 1 tablet by mouth once daily.      thiamine 100 MG tablet Take 100 mg by mouth once daily.      vitamin D (VITAMIN D3) 1000 units Tab Take 2 tablets by mouth once daily.       Social History     Tobacco Use    Smoking status: Former     Types: Cigarettes    Smokeless tobacco: Never   Substance Use Topics    Alcohol use: Not Currently    Drug use: Yes     Frequency: 14.0 times per week     Types: Hydrocodone     Comment: Pain management         Vitals:    12/11/23 1343   BP: (!) 134/99   Pulse: 91     General: Well nourished w/o distress  Pulm: CTA bilaterally, normal work of breathing  CV: S1, S2 w/o  murmurs or gallops; no edema noted  GI: Soft, non-tender to palpation,no masses on palpation  MSK: no gross deformities of bilateral UE & LE's  Neuro: AAOx4; LUE motor strength 0/5, RUE 5/5, LLE 0/5, RLE 5/5, spasticity left hand; CBN II-XII grossly intact, no dysarthria, no facial asymmetry  Psych: Cooperative; appropriate mood and affect, answers all questions appropriately    Assessment: Stroke  Spasticity    Plan: Recommend changing Flexeril to baclofen (his Pain Management MD will do this)  Add citicoline, ALA, B-complex

## 2024-11-13 ENCOUNTER — OFFICE VISIT (OUTPATIENT)
Dept: CARDIAC SURGERY | Facility: CLINIC | Age: 59
End: 2024-11-13
Payer: COMMERCIAL

## 2024-11-13 VITALS
BODY MASS INDEX: 32.22 KG/M2 | WEIGHT: 225.06 LBS | SYSTOLIC BLOOD PRESSURE: 107 MMHG | DIASTOLIC BLOOD PRESSURE: 67 MMHG | HEART RATE: 84 BPM | OXYGEN SATURATION: 93 % | HEIGHT: 70 IN

## 2024-11-13 DIAGNOSIS — I65.23 BILATERAL CAROTID ARTERY STENOSIS: Primary | ICD-10-CM

## 2024-11-13 PROCEDURE — 1159F MED LIST DOCD IN RCRD: CPT | Mod: CPTII,,, | Performed by: THORACIC SURGERY (CARDIOTHORACIC VASCULAR SURGERY)

## 2024-11-13 PROCEDURE — 1160F RVW MEDS BY RX/DR IN RCRD: CPT | Mod: CPTII,,, | Performed by: THORACIC SURGERY (CARDIOTHORACIC VASCULAR SURGERY)

## 2024-11-13 PROCEDURE — 3074F SYST BP LT 130 MM HG: CPT | Mod: CPTII,,, | Performed by: THORACIC SURGERY (CARDIOTHORACIC VASCULAR SURGERY)

## 2024-11-13 PROCEDURE — 3078F DIAST BP <80 MM HG: CPT | Mod: CPTII,,, | Performed by: THORACIC SURGERY (CARDIOTHORACIC VASCULAR SURGERY)

## 2024-11-13 PROCEDURE — 3008F BODY MASS INDEX DOCD: CPT | Mod: CPTII,,, | Performed by: THORACIC SURGERY (CARDIOTHORACIC VASCULAR SURGERY)

## 2024-11-13 PROCEDURE — 99214 OFFICE O/P EST MOD 30 MIN: CPT | Mod: ,,, | Performed by: THORACIC SURGERY (CARDIOTHORACIC VASCULAR SURGERY)

## 2024-11-13 PROCEDURE — 4010F ACE/ARB THERAPY RXD/TAKEN: CPT | Mod: CPTII,,, | Performed by: THORACIC SURGERY (CARDIOTHORACIC VASCULAR SURGERY)

## 2024-11-13 NOTE — PROGRESS NOTES
History & Physical    SUBJECTIVE:     History of Present Illness:  The patient is presenting for follow-up carotid artery disease.  He has been doing well.  He reports some epigastric pain that response to Tums.  He had a nuclear stress test that was negative.  He has not abdominal ultrasound to rule out aneurysm also that was negative as per patient report with Dr. Naivd Boykin      Chief Complaint   Patient presents with    Follow-up     1 YEAR W/ BILAT CAROTID U/S. DX: STABLE CAROTID ARTERY DISEASE. PREVIOUS TESTING <40% LT, TOTALLY OCCLUDED RT.       Review of patient's allergies indicates:  No Known Allergies    Current Outpatient Medications   Medication Sig Dispense Refill    amitriptyline (ELAVIL) 25 MG tablet Take 25 mg by mouth every evening.      amLODIPine (NORVASC) 5 MG tablet Take 5 mg by mouth every morning.      ascorbic acid, vitamin C, (VITAMIN C) 500 MG tablet Take 2 tablets by mouth once daily.      aspirin (ECOTRIN) 81 MG EC tablet Take 81 mg by mouth nightly.      atorvastatin (LIPITOR) 80 MG tablet Take 80 mg by mouth once daily.      clopidogreL (PLAVIX) 75 mg tablet Take 1 tablet by mouth once daily.      coenzyme Q10 100 mg capsule Take 100 mg by mouth once daily.      cyanocobalamin, vitamin B-12, 5,000 mcg Cap Take 5 tablets by mouth once daily.      cyclobenzaprine (FLEXERIL) 10 MG tablet Take 10 mg by mouth 3 (three) times daily.      DULoxetine (CYMBALTA) 60 MG capsule Take 60 mg by mouth nightly.      ezetimibe (ZETIA) 10 mg tablet Take 10 mg by mouth once daily.      folic acid (FOLVITE) 1 MG tablet Take 1 tablet by mouth once daily.      HYDROcodone-acetaminophen (NORCO)  mg per tablet Take 1 tablet by mouth 2 (two) times daily.      lisinopriL 10 MG tablet Take 10 mg by mouth once daily.      melatonin 10 mg Tab Take 10 mg by mouth nightly.      meloxicam (MOBIC) 15 MG tablet Take 15 mg by mouth nightly.      metoprolol succinate (TOPROL-XL) 25 MG 24 hr tablet Take 25 mg by  mouth once daily.      thiamine 100 MG tablet Take 100 mg by mouth once daily.      vitamin D (VITAMIN D3) 1000 units Tab Take 2 tablets by mouth once daily.       No current facility-administered medications for this visit.       Past Medical History:   Diagnosis Date    CVA (cerebral vascular accident)     Depression     Hypertension     Mixed hyperlipidemia      Past Surgical History:   Procedure Laterality Date    HERNIA REPAIR  18 years    TONSILLECTOMY  55 years    VASECTOMY  23 years     Family History   Problem Relation Name Age of Onset    Arthritis Mother Valeria     Hearing loss Mother Valeria         Wears hearing aids    Heart disease Mother Valeria         Blockage    Stroke Mother Valeria     Arthritis Father Raywood     Diabetes Father Raywood     Hearing loss Father Raywood         Wears hearing aids    Heart disease Father Raywood         Heart attacks, blockages    Kidney disease Father Raywood     Vision loss Father Raywood         Diabetic    Heart disease Maternal Grandfather Jose Gordon         Valve replacement, blockage    Early death Maternal Grandmother Ronna Gordon     Cancer Paternal Grandfather Lul Mcnair     Stroke Paternal Grandmother Lisbeth Mcnair     Alcohol abuse Brother Fernando     Arthritis Brother Fernando     Alcohol abuse Brother Zander     Cancer Brother Zander         Skin cancer    Arthritis Brother Wade     Asthma Maternal Aunt Torrey Ricohomero      Social History     Tobacco Use    Smoking status: Former     Types: Cigarettes    Smokeless tobacco: Never   Substance Use Topics    Alcohol use: Not Currently    Drug use: Yes     Frequency: 14.0 times per week     Types: Hydrocodone     Comment: Pain management        Review of Systems:  Review of Systems   Constitutional: Negative.    HENT: Negative.     Eyes: Negative.    Respiratory: Negative.     Cardiovascular: Negative.    Gastrointestinal:  Positive for abdominal pain.   Endocrine: Negative.    Genitourinary: Negative.   "  Musculoskeletal: Negative.         Claudications   Skin: Negative.    Allergic/Immunologic: Negative.    Neurological: Negative.    Hematological: Negative.    Psychiatric/Behavioral: Negative.         OBJECTIVE:     Vital Signs (Most Recent)  Pulse: 84 (11/13/24 1038)  BP: 107/67 (11/13/24 1038)  SpO2: (!) 93 % (11/13/24 1038)  5' 10" (1.778 m)  102.1 kg (225 lb 1.4 oz)     Physical Exam:  Physical Exam  Vitals reviewed.   Constitutional:       Appearance: Normal appearance.   HENT:      Head: Normocephalic and atraumatic.      Nose: Nose normal.      Mouth/Throat:      Mouth: Mucous membranes are dry.      Pharynx: Oropharynx is clear.   Eyes:      Extraocular Movements: Extraocular movements intact.      Conjunctiva/sclera: Conjunctivae normal.      Pupils: Pupils are equal, round, and reactive to light.   Cardiovascular:      Rate and Rhythm: Normal rate and regular rhythm.      Pulses: Normal pulses.   Pulmonary:      Effort: Pulmonary effort is normal.      Breath sounds: Normal breath sounds.   Abdominal:      General: Abdomen is flat.      Palpations: Abdomen is soft.   Musculoskeletal:         General: Normal range of motion.      Cervical back: Neck supple.   Skin:     General: Skin is warm and dry.   Neurological:      General: No focal deficit present.   Psychiatric:         Mood and Affect: Mood normal.         Laboratory:  None      Diagnostic Results:  Carotid ultrasound with a chronic total occlusion of the right common and internal carotid arteries.  40-59% stenosis of the left internal carotid artery.  Bilateral vertebral flow is antegrade      ASSESSMENT/PLAN:     Carotid artery disease.  There has been some progression in the stenosis.  Plan to bring him back to the clinic in 4-6 months with a carotid ultrasound as he has a totally occluded right carotid.  Epigastric pain responding to Tums.  We will prescribe proton pump inhibitor.  I advised him to talk to his primary care doctor for a right " upper quadrant ultrasound.  Follow up with Dr. Navid Boykin

## 2025-01-28 ENCOUNTER — PATIENT MESSAGE (OUTPATIENT)
Dept: ADMINISTRATIVE | Facility: OTHER | Age: 60
End: 2025-01-28
Payer: COMMERCIAL

## 2025-01-30 ENCOUNTER — HOSPITAL ENCOUNTER (OUTPATIENT)
Facility: HOSPITAL | Age: 60
Discharge: HOME | End: 2025-01-30
Attending: STUDENT IN AN ORGANIZED HEALTH CARE EDUCATION/TRAINING PROGRAM | Admitting: STUDENT IN AN ORGANIZED HEALTH CARE EDUCATION/TRAINING PROGRAM
Payer: COMMERCIAL

## 2025-01-30 VITALS
OXYGEN SATURATION: 97 % | TEMPERATURE: 99 F | HEIGHT: 70 IN | SYSTOLIC BLOOD PRESSURE: 147 MMHG | DIASTOLIC BLOOD PRESSURE: 89 MMHG | WEIGHT: 239.19 LBS | HEART RATE: 83 BPM | BODY MASS INDEX: 34.24 KG/M2

## 2025-01-30 DIAGNOSIS — I63.521: ICD-10-CM

## 2025-01-30 PROCEDURE — 63600175 PHARM REV CODE 636 W HCPCS: Performed by: STUDENT IN AN ORGANIZED HEALTH CARE EDUCATION/TRAINING PROGRAM

## 2025-01-30 PROCEDURE — 33286 RMVL SUBQ CAR RHYTHM MNTR: CPT | Performed by: STUDENT IN AN ORGANIZED HEALTH CARE EDUCATION/TRAINING PROGRAM

## 2025-01-30 RX ORDER — EPINEPHRINE 0.22MG
100 AEROSOL WITH ADAPTER (ML) INHALATION DAILY
COMMUNITY

## 2025-01-30 RX ORDER — ZINC GLUCONATE 50 MG
50 TABLET ORAL DAILY
COMMUNITY

## 2025-01-30 RX ORDER — CEFAZOLIN SODIUM 2 G/50ML
2 SOLUTION INTRAVENOUS
Status: DISCONTINUED | OUTPATIENT
Start: 2025-01-30 | End: 2025-01-30 | Stop reason: HOSPADM

## 2025-01-30 RX ORDER — BACLOFEN 10 MG/1
10 TABLET ORAL 3 TIMES DAILY
COMMUNITY

## 2025-01-30 RX ORDER — IBUPROFEN 100 MG/5ML
1000 SUSPENSION, ORAL (FINAL DOSE FORM) ORAL DAILY
COMMUNITY

## 2025-01-30 RX ORDER — ACETAMINOPHEN 500 MG
5000 TABLET ORAL DAILY
COMMUNITY

## 2025-01-30 RX ORDER — LIDOCAINE HYDROCHLORIDE 10 MG/ML
INJECTION, SOLUTION INFILTRATION; PERINEURAL
Status: DISCONTINUED | OUTPATIENT
Start: 2025-01-30 | End: 2025-01-30 | Stop reason: HOSPADM

## 2025-01-30 RX ORDER — PANTOPRAZOLE SODIUM 40 MG/1
40 TABLET, DELAYED RELEASE ORAL DAILY
COMMUNITY

## 2025-01-30 RX ADMIN — CEFAZOLIN SODIUM 2 G: 2 SOLUTION INTRAVENOUS at 08:01

## 2025-01-30 NOTE — DISCHARGE INSTRUCTIONS
Remove dressing in 3 days.If just a sterile glue was used do not pick at it, it will flake off like a scab over time.   Do not get the dressing/site wet for 3 days.   If incision looks red or swollen with any discharge coming out of it or if you start running a fever go back to your nearest emergency room.   Call the office if you have any questions [phone].

## 2025-01-30 NOTE — BRIEF OP NOTE
[facility] - [facility]  Brief Operative Note    SUMMARY     Surgery Date: 1/30/2025     Surgeons and Role:     * Navid Boykin Jr., MD - Primary    Assisting Surgeon: None    Pre-op Diagnosis:  Arterial ischemic stroke, ALEXIS (anterior cerebral artery), right, acute [I63.521]    Post-op Diagnosis:  Post-Op Diagnosis Codes:     * Arterial ischemic stroke, ALEXIS (anterior cerebral artery), right, acute [I63.521]    Procedure(s) (LRB):  REMOVAL, IMPLANTABLE LOOP RECORDER (N/A)    Anesthesia: Local    Implants:  * No implants in log *    Operative Findings: Successful loop recorder removal    Estimated Blood Loss: * No values recorded between 1/30/2025  7:48 AM and 1/30/2025  8:08 AM *    Estimated Blood Loss has been documented.         Specimens:   Specimen (24h ago, onward)      None            [id number]

## 2025-01-30 NOTE — PLAN OF CARE
DC instructions reviewed with patient and spouse; verbalized understanding. DC home with no complications.

## 2025-02-01 NOTE — DISCHARGE SUMMARY
[facility] - [facility]  Discharge Note  Short Stay     Procedure(s) (LRB): loop recorder removal        OUTCOME: Patient tolerated treatment/procedure well without complication and is now ready for discharge.     DISPOSITION: Home or Self Care     FINAL DIAGNOSIS:  successful loop recorder removal     FOLLOWUP: In clinic     DISCHARGE INSTRUCTIONS:  No discharge procedures on file.      TIME SPENT ON DISCHARGE: 30 minutes

## 2025-05-21 ENCOUNTER — OFFICE VISIT (OUTPATIENT)
Dept: CARDIAC SURGERY | Facility: CLINIC | Age: 60
End: 2025-05-21
Payer: COMMERCIAL

## 2025-05-21 VITALS
DIASTOLIC BLOOD PRESSURE: 83 MMHG | HEART RATE: 76 BPM | OXYGEN SATURATION: 98 % | HEIGHT: 70 IN | WEIGHT: 239.19 LBS | BODY MASS INDEX: 34.24 KG/M2 | SYSTOLIC BLOOD PRESSURE: 146 MMHG

## 2025-05-21 DIAGNOSIS — I65.23 BILATERAL CAROTID ARTERY STENOSIS: Primary | ICD-10-CM

## 2025-05-21 NOTE — PROGRESS NOTES
History & Physical    SUBJECTIVE:     History of Present Illness:  The patient is presenting for follow-up bilateral carotid artery stenosis with a totally occluded right.  He returns to the clinic today with no new symptoms.  He has been doing well.  He is still have weakness over the left upper extremity and left lower extremity.      Chief Complaint   Patient presents with    Follow-up     6 MONTHS WITH LEFT CAROTID U/S. DX: CAROTID STENOSIS-PREVIOUS U/S-CHRONIC TOTAL OCCLUSION RT ICA, 40-59% LT ICA. BILATERAL VERTEBRAL FLOW IS ANTEGRADE.       Review of patient's allergies indicates:  No Known Allergies    Current Medications[1]    Past Medical History:   Diagnosis Date    Anxiety disorder, unspecified     Coronary artery disease     CVA (cerebral vascular accident)     Depression     ETOH abuse     Hypertension     Mixed hyperlipidemia      Past Surgical History:   Procedure Laterality Date    HERNIA REPAIR  18 years    TONSILLECTOMY  55 years    VASECTOMY  23 years     Family History   Problem Relation Name Age of Onset    Arthritis Mother Valeria     Hearing loss Mother Valeria         Wears hearing aids    Heart disease Mother Valeria         Blockage    Stroke Mother Valeria     Arthritis Father Raywood     Diabetes Father Raywood     Hearing loss Father Raywood         Wears hearing aids    Heart disease Father Raywood         Heart attacks, blockages    Kidney disease Father Raywood     Vision loss Father Raywood         Diabetic    Heart disease Maternal Grandfather Jose Gordon         Valve replacement, blockage    Early death Maternal Grandmother Ronna Vinckeshia     Cancer Paternal Grandfather Lul Mcnair     Stroke Paternal Grandmother Lisbeth Xu     Alcohol abuse Brother Fernando     Arthritis Brother Fernando     Alcohol abuse Brother Zander     Cancer Brother Zander         Skin cancer    Arthritis Brother Wade     Asthma Maternal Aunt Padmajameenakshi Ileana      Social History[2]     Review of Systems:  Review  "of Systems   Constitutional: Negative.    HENT: Negative.     Eyes: Negative.    Respiratory: Negative.     Cardiovascular: Negative.    Gastrointestinal: Negative.    Endocrine: Negative.    Genitourinary: Negative.    Musculoskeletal: Negative.         Claudications   Skin: Negative.    Allergic/Immunologic: Negative.    Neurological: Negative.    Hematological: Negative.    Psychiatric/Behavioral: Negative.         OBJECTIVE:     Vital Signs (Most Recent)  Pulse: 76 (05/21/25 0944)  BP: (!) 146/83 (05/21/25 0944)  SpO2: 98 % (05/21/25 0944)  5' 10" (1.778 m)  108.5 kg (239 lb 3.2 oz)     Physical Exam:  Physical Exam  Vitals reviewed.   Constitutional:       Appearance: Normal appearance.   HENT:      Head: Normocephalic and atraumatic.      Nose: Nose normal.      Mouth/Throat:      Mouth: Mucous membranes are dry.      Pharynx: Oropharynx is clear.   Eyes:      Extraocular Movements: Extraocular movements intact.      Conjunctiva/sclera: Conjunctivae normal.      Pupils: Pupils are equal, round, and reactive to light.   Cardiovascular:      Rate and Rhythm: Normal rate and regular rhythm.      Pulses: Normal pulses.   Pulmonary:      Effort: Pulmonary effort is normal.      Breath sounds: Normal breath sounds.   Abdominal:      General: Abdomen is flat.      Palpations: Abdomen is soft.   Musculoskeletal:         General: Normal range of motion.      Cervical back: Neck supple.   Skin:     General: Skin is warm and dry.   Neurological:      General: No focal deficit present.      Motor: Weakness (Left upper extremity, left lower extremity) present.   Psychiatric:         Mood and Affect: Mood normal.         Laboratory:  None      Diagnostic Results:  Carotid ultrasound performed today revealed the known right chronic total occlusion of the internal carotid artery.  Less than 50% on the left carotid artery.  He has bilateral antegrade vertebral flow.      ASSESSMENT/PLAN:     Stable carotid artery disease.  The " patient was reassured.  Follow up in 1 year with carotid ultrasound                     [1]   Current Outpatient Medications   Medication Sig Dispense Refill    amitriptyline (ELAVIL) 25 MG tablet Take 25 mg by mouth every evening.      amLODIPine (NORVASC) 5 MG tablet Take 5 mg by mouth every morning.      ascorbic acid, vitamin C, (VITAMIN C) 1000 MG tablet Take 1,000 mg by mouth once daily.      aspirin (ECOTRIN) 81 MG EC tablet Take 81 mg by mouth nightly.      atorvastatin (LIPITOR) 80 MG tablet Take 80 mg by mouth once daily.      cholecalciferol, vitamin D3, (VITAMIN D3) 125 mcg (5,000 unit) Tab Take 5,000 Units by mouth once daily.      CITICOLINE ORAL Take 1 capsule by mouth Daily.      clopidogreL (PLAVIX) 75 mg tablet Take 1 tablet by mouth once daily.      coenzyme Q10 (CO Q-10) 100 mg capsule Take 100 mg by mouth once daily.      cyanocobalamin, vitamin B-12, 5,000 mcg Cap Take 5 tablets by mouth once daily.      DULoxetine (CYMBALTA) 60 MG capsule Take 60 mg by mouth nightly.      ezetimibe (ZETIA) 10 mg tablet Take 10 mg by mouth once daily.      folic acid (FOLVITE) 1 MG tablet Take 1 tablet by mouth once daily.      HYDROcodone-acetaminophen (NORCO)  mg per tablet Take 1 tablet by mouth 2 (two) times daily.      lisinopriL 10 MG tablet Take 10 mg by mouth once daily.      melatonin 10 mg Tab Take 10 mg by mouth nightly.      meloxicam (MOBIC) 15 MG tablet Take 15 mg by mouth nightly.      metoprolol succinate (TOPROL-XL) 25 MG 24 hr tablet Take 25 mg by mouth once daily.      pantoprazole (PROTONIX) 40 MG tablet Take 40 mg by mouth once daily.      zinc gluconate 50 mg tablet Take 50 mg by mouth once daily.       No current facility-administered medications for this visit.   [2]   Social History  Tobacco Use    Smoking status: Former     Types: Cigarettes    Smokeless tobacco: Never   Substance Use Topics    Alcohol use: Not Currently    Drug use: Yes     Frequency: 14.0 times per week      Types: Hydrocodone     Comment: Pain management